# Patient Record
Sex: FEMALE | Race: WHITE | NOT HISPANIC OR LATINO | Employment: PART TIME | URBAN - METROPOLITAN AREA
[De-identification: names, ages, dates, MRNs, and addresses within clinical notes are randomized per-mention and may not be internally consistent; named-entity substitution may affect disease eponyms.]

---

## 2018-01-30 PROBLEM — I47.10 PAROXYSMAL SUPRAVENTRICULAR TACHYCARDIA: Status: ACTIVE | Noted: 2017-09-22

## 2022-06-06 DIAGNOSIS — Z30.40 ENCOUNTER FOR SURVEILLANCE OF CONTRACEPTIVES, UNSPECIFIED CONTRACEPTIVE: ICD-10-CM

## 2022-06-06 RX ORDER — NORELGESTROMIN AND ETHINYL ESTRADIOL 150; 35 UG/D; UG/D
1 PATCH TRANSDERMAL WEEKLY
Qty: 9 PATCH | Refills: 6 | Status: SHIPPED | OUTPATIENT
Start: 2022-06-06 | End: 2022-07-18 | Stop reason: SDUPTHER

## 2023-08-09 ENCOUNTER — ROUTINE PRENATAL (OUTPATIENT)
Dept: OBGYN CLINIC | Facility: CLINIC | Age: 28
End: 2023-08-09

## 2023-08-09 VITALS — BODY MASS INDEX: 26.75 KG/M2 | WEIGHT: 151 LBS | SYSTOLIC BLOOD PRESSURE: 100 MMHG | DIASTOLIC BLOOD PRESSURE: 58 MMHG

## 2023-08-09 DIAGNOSIS — Z34.02 ENCOUNTER FOR SUPERVISION OF NORMAL FIRST PREGNANCY IN SECOND TRIMESTER: Primary | ICD-10-CM

## 2023-08-09 PROCEDURE — PNV: Performed by: OBSTETRICS & GYNECOLOGY

## 2023-08-09 NOTE — PROGRESS NOTES
25 y.o. Spencer Kenny female at 20w4d (Estimated Date of Delivery: 23) for PNV. Pre- Vitals    Flowsheet Row Most Recent Value   Prenatal Assessment    Fetal Heart Rate 150   Movement Present   Prenatal Vitals    Blood Pressure 100/58   Weight - Scale 68.5 kg (151 lb)   Urine Albumin/Glucose    Dilation/Effacement/Station    Vaginal Drainage    Edema         TW.711 kg (17 lb)    Cramping/contractions: no  Bleeding: no  LOF: no  FM: yes  28 wk labs ordered: yes  TSH indicated & ordered: no  RH negative - type & screen ordered: no  Prenatal labs complete (including Heb B, HIV): yes completed 2023. Discussed constipation - reviewed fiber supplement and hydration, decreased gastric motility and use of miralax and stool softeners to help. RTO in 4 weeks.

## 2023-08-12 ENCOUNTER — LAB (OUTPATIENT)
Dept: LAB | Age: 28
End: 2023-08-12
Payer: COMMERCIAL

## 2023-08-12 DIAGNOSIS — Z34.02 ENCOUNTER FOR SUPERVISION OF NORMAL FIRST PREGNANCY IN SECOND TRIMESTER: ICD-10-CM

## 2023-08-12 LAB
ERYTHROCYTE [DISTWIDTH] IN BLOOD BY AUTOMATED COUNT: 12.8 % (ref 11.6–15.1)
GLUCOSE 1H P 50 G GLC PO SERPL-MCNC: 123 MG/DL (ref 40–134)
HCT VFR BLD AUTO: 36.9 % (ref 34.8–46.1)
HGB BLD-MCNC: 12.2 G/DL (ref 11.5–15.4)
MCH RBC QN AUTO: 31.2 PG (ref 26.8–34.3)
MCHC RBC AUTO-ENTMCNC: 33.1 G/DL (ref 31.4–37.4)
MCV RBC AUTO: 94 FL (ref 82–98)
PLATELET # BLD AUTO: 295 THOUSANDS/UL (ref 149–390)
PMV BLD AUTO: 9.7 FL (ref 8.9–12.7)
RBC # BLD AUTO: 3.91 MILLION/UL (ref 3.81–5.12)
TREPONEMA PALLIDUM IGG+IGM AB [PRESENCE] IN SERUM OR PLASMA BY IMMUNOASSAY: NORMAL
WBC # BLD AUTO: 10.91 THOUSAND/UL (ref 4.31–10.16)

## 2023-08-12 PROCEDURE — 86780 TREPONEMA PALLIDUM: CPT

## 2023-08-12 PROCEDURE — 36415 COLL VENOUS BLD VENIPUNCTURE: CPT

## 2023-08-12 PROCEDURE — 82950 GLUCOSE TEST: CPT

## 2023-08-12 PROCEDURE — 85027 COMPLETE CBC AUTOMATED: CPT

## 2023-09-05 ENCOUNTER — ROUTINE PRENATAL (OUTPATIENT)
Dept: OBGYN CLINIC | Facility: CLINIC | Age: 28
End: 2023-09-05
Payer: COMMERCIAL

## 2023-09-05 VITALS — BODY MASS INDEX: 28.2 KG/M2 | SYSTOLIC BLOOD PRESSURE: 102 MMHG | DIASTOLIC BLOOD PRESSURE: 64 MMHG | WEIGHT: 159.2 LBS

## 2023-09-05 DIAGNOSIS — Z23 NEED FOR TDAP VACCINATION: ICD-10-CM

## 2023-09-05 DIAGNOSIS — O44.43 LOW-LYING PLACENTA IN THIRD TRIMESTER: Primary | ICD-10-CM

## 2023-09-05 DIAGNOSIS — Z3A.28 28 WEEKS GESTATION OF PREGNANCY: ICD-10-CM

## 2023-09-05 PROCEDURE — 90471 IMMUNIZATION ADMIN: CPT | Performed by: PHYSICIAN ASSISTANT

## 2023-09-05 PROCEDURE — PNV: Performed by: PHYSICIAN ASSISTANT

## 2023-09-05 PROCEDURE — 90715 TDAP VACCINE 7 YRS/> IM: CPT | Performed by: PHYSICIAN ASSISTANT

## 2023-09-05 NOTE — PROGRESS NOTES
Patient is a 26 YO  female presenting to the office at 28.0 weeks for routine OB care.    BP: 102/64  TWlb  Fetal Movement: yes good movement  Feeling well today  Denies LOF, CTX, VB  Discussed third trimester teaching  Reviewed fetal kick counts, normal FM  Reviewed signs of PTL  Reviewed red folder, consents, birth plan  Delivery consent obtained   Breastfeeding: plans to  Breast Pump: ordered  TDAP: received  FLU Vaccine: recommend  COVID Vaccine: received  28 Week Labs: WNL  Low lying placenta - repeat growth US @ 32 weeks  RTO 2 weeks for routine OB F/U

## 2023-09-05 NOTE — PROGRESS NOTES
Routine prenatal 28 weeks, red folder visit. Pt c/o dizziness and nausea. Deneis vb,lof, and ctx. Tdap: Given   Red folder: Reviewed, question regarding, fetal movement/ kick counting.    Breast pump: ordered

## 2023-09-06 LAB
DME PARACHUTE DELIVERY DATE REQUESTED: NORMAL
DME PARACHUTE ITEM DESCRIPTION: NORMAL
DME PARACHUTE ORDER STATUS: NORMAL
DME PARACHUTE SUPPLIER NAME: NORMAL
DME PARACHUTE SUPPLIER PHONE: NORMAL

## 2023-09-21 ENCOUNTER — ROUTINE PRENATAL (OUTPATIENT)
Dept: OBGYN CLINIC | Facility: CLINIC | Age: 28
End: 2023-09-21

## 2023-09-21 VITALS — BODY MASS INDEX: 28.87 KG/M2 | SYSTOLIC BLOOD PRESSURE: 110 MMHG | DIASTOLIC BLOOD PRESSURE: 72 MMHG | WEIGHT: 163 LBS

## 2023-09-21 DIAGNOSIS — Z34.03 PRENATAL CARE, FIRST PREGNANCY IN THIRD TRIMESTER: Primary | ICD-10-CM

## 2023-09-21 PROCEDURE — PNV: Performed by: PHYSICIAN ASSISTANT

## 2023-09-21 NOTE — PROGRESS NOTES
Pt is here for her 30w prenatal. Pt denies any swelling, leakage,bleeding,pressure, and contractions. Pt states she has had moments where she feels herself about to faint.

## 2023-09-22 LAB
DME PARACHUTE DELIVERY DATE ACTUAL: NORMAL
DME PARACHUTE DELIVERY DATE REQUESTED: NORMAL
DME PARACHUTE ITEM DESCRIPTION: NORMAL
DME PARACHUTE ORDER STATUS: NORMAL
DME PARACHUTE SUPPLIER NAME: NORMAL
DME PARACHUTE SUPPLIER PHONE: NORMAL

## 2023-09-27 NOTE — PROGRESS NOTES
25 y.o.  female at 31w1d (Estimated Date of Delivery: 23) for PNV. Pre-Miri Vitals    Flowsheet Row Most Recent Value   Prenatal Assessment    Fetal Heart Rate 149   Fundal Height (cm) 30 cm   Movement Present   Prenatal Vitals    Blood Pressure 110/72   Weight - Scale 73.9 kg (163 lb)   Urine Albumin/Glucose    Dilation/Effacement/Station    Vaginal Drainage    Edema         TW.2 kg (29 lb)    Leakage of fluid: no  Vaginal bleeding: no  Contractions/Cramping: no  Fetal movement: yes  Overall feeling well  Pt c/o moments where she feels like she will pass out  BP normal to low  encouraged pt to eat, small, frequents snacks/meals and hydrate more  BW all normal  Ice pack on neck helps  Blood type A pos  Recd tdap and breast pump    RTO in 2 weeks.

## 2023-10-04 ENCOUNTER — TELEPHONE (OUTPATIENT)
Facility: HOSPITAL | Age: 28
End: 2023-10-04

## 2023-10-04 NOTE — TELEPHONE ENCOUNTER
Left voicemail informing the patient of a possible scan/send for her upcoming appointment on Monday. Requested she give our office a call back at [phone] with any questions or if she would need to reschedule.

## 2023-10-09 ENCOUNTER — ROUTINE PRENATAL (OUTPATIENT)
Dept: OBGYN CLINIC | Facility: CLINIC | Age: 28
End: 2023-10-09

## 2023-10-09 ENCOUNTER — ROUTINE PRENATAL (OUTPATIENT)
Facility: HOSPITAL | Age: 28
End: 2023-10-09
Payer: COMMERCIAL

## 2023-10-09 VITALS — BODY MASS INDEX: 29.58 KG/M2 | WEIGHT: 167 LBS | DIASTOLIC BLOOD PRESSURE: 72 MMHG | SYSTOLIC BLOOD PRESSURE: 110 MMHG

## 2023-10-09 VITALS
WEIGHT: 166.4 LBS | DIASTOLIC BLOOD PRESSURE: 70 MMHG | HEART RATE: 88 BPM | HEIGHT: 63 IN | SYSTOLIC BLOOD PRESSURE: 110 MMHG | BODY MASS INDEX: 29.48 KG/M2

## 2023-10-09 DIAGNOSIS — Z3A.32 32 WEEKS GESTATION OF PREGNANCY: ICD-10-CM

## 2023-10-09 DIAGNOSIS — Z03.72 SUSPECTED PROBLEM WITH PLACENTA NOT FOUND: Primary | ICD-10-CM

## 2023-10-09 DIAGNOSIS — Z34.03 ENCOUNTER FOR SUPERVISION OF NORMAL FIRST PREGNANCY IN THIRD TRIMESTER: Primary | ICD-10-CM

## 2023-10-09 PROBLEM — O44.43 LOW-LYING PLACENTA IN THIRD TRIMESTER: Status: RESOLVED | Noted: 2023-09-05 | Resolved: 2023-10-09

## 2023-10-09 PROCEDURE — PNV: Performed by: OBSTETRICS & GYNECOLOGY

## 2023-10-09 PROCEDURE — 76816 OB US FOLLOW-UP PER FETUS: CPT | Performed by: OBSTETRICS & GYNECOLOGY

## 2023-10-09 NOTE — PROGRESS NOTES
Ultrasound Probe Disinfection    A transvaginal ultrasound was performed. Prior to use, disinfection was performed with High Level Disinfection Process ([employer]). Probe serial number A2: [id number] was used.       Christine Williamson  10/09/23  10:12 AM

## 2023-10-09 NOTE — PROGRESS NOTES
25 [facility] (Estimated Date of Delivery: 23) for PNV. Pre- Vitals      Flowsheet Row Most Recent Value   Prenatal Assessment    Fetal Heart Rate 140   Fundal Height (cm) 33 cm   Movement Present   Prenatal Vitals    Blood Pressure 110/72   Weight - Scale 75.8 kg (167 lb)   Urine Albumin/Glucose    Dilation/Effacement/Station    Vaginal Drainage    Edema           TWG: 15 kg (33 lb)    Leakage of fluid: no  Vaginal bleeding: no  Contractions/Cramping: no  Fetal movement: yes  Perineal massage recommendations reviewed with patient. Additional instructions provided to patient in AVS.   RTO in 2 weeks.

## 2023-10-09 NOTE — PROGRESS NOTES
Sharon Thayer is here today at 32w5d for an ultrasound for fetal growth, placental location and to complete the missed anatomy of the ductal arch, hands, lower legs, profile, nose lips, palate and short axis of the great vessels. She had normal 28-week labs. Problem list:  1. Low-lying placenta  2. Maternal asthma    Ultrasound findings: The ultrasound today shows normal interval fetal growth and fluid. Prior missed anatomy was reviewed and appears normal except the fetal hands and the fetal chin are still limited secondary to fetal position. The placenta is no longer low-lying. Fetal hands were present on her 20 week US but not viewed well enough to count fingers. The 3D views of this fetus at 25 weeks and today are not suggestive of a problem with the fetal profile. Pregnancy ultrasound has limitations and is unable to detect all forms of fetal congenital abnormalities. The inaccuracy in the EFW can be off by 1 lb either way in the third trimester. Follow up recommended:   1. No further follow US is recommended.      Jose Cordero MD

## 2023-10-09 NOTE — PATIENT INSTRUCTIONS
Perineal/Vaginal Massage    Your perineum includes the area at the back of your vagina and vulva and goes to your anus and rectum, and includes the back portion of the birth canal. It is the tissues of your perineum that create a strong pelvic floor, and allow you to walk upright and prevent you from urinating every time you cough. Needless to say, it is important that these tissues are intact and strong, but they also need to be flexible enough to stretch during childbirth to allow the baby to move through the birth canal.    Perineal massage during the last several weeks of pregnancy can relax and stretch the tissues of your perineum. This gentle massage keeps the perineal tissues flexible and supple and prepares them to relax and expand naturally during delivery. What can I do now to decrease my chances of tearing during delivery? Massaging around the vaginal opening by you (or your partner), either before birth or during the 2nd stage of labor (massage done by your physician), can reduce the likelihood of perineal tearing during childbirth. Likewise, the use of warm packs held on the perineum during the pushing stage of labor can reduce the severity of your tear. This will happen during the pushing stage of labor. At home, you could also help reduce the chances of injury that may occur during the birth of your child through perineal massage. When should I do this? Starting around or shortly after 34 weeks of pregnancy, your or your partner should provide 5 to 10 minutes of vaginal massage 1 to 4 times per week. What do I use?   Types of lubricant to try:  natural oils, like organic sunflower, grapeseed, coconut, almond, or olive  personal lubricants, like K-Y Jelly, are also a good choice because they’re water soluble  your body’s own vaginal lubricant, if this makes you more comfortable  Whatever you choose, stay away from using synthetic oils or lubricants, like baby oil, mineral oil, or petroleum jelly. How? 1. Find a comfortable position that allows you to reach your perineum, either by reaching your hands in front of you or behind you. For example, a) Sitting propped up in bed with your knees bent, b) squatting against a wall for support with or without the aid of a stack of books or stool, and c) raising one leg such as in the shower or on the toilet. Feel free to use di?erent positions on di?erent days or even change positions during your massage if you become uncomfortable or tired. 2. Use either almond, coconut or olive oil and water mixture on 1 or 2 fingers or thumbs, depending on comfort. 3. Perineal massage can be done with either or both thumbs, your index or middle ?ngers, or two ?ngers on each hand. 4. Insert your thumbs or ?ngers about an inch inside the vagina (up to the first knuckle or just past that), resting your palms against your inside leg. 5. Apply sweeping downward inside word pressure from 3 to 9 o'clock for 5 to 10 minutes. Repeat 1 to 4 times a week. 6. The goal is to stretch and massage the back portion of the birth canal, down towards the anus and then apart side to side, using more and more pressure over time. Press gently down with your thumbs or ?ngers toward the anus, and then pull them apart from each other and out to the sides. Several times during your massage, hold this stretched position and consciously relax your muscles in this region. Keep massaging down and out to stretch and relax these tissues. Pressure should not be painful, but during the first couple of weeks, it is normal to feel a slight burning or stretching sensation.

## 2023-10-09 NOTE — PROGRESS NOTES
Pt is here for her 32w prenatal. Pt denies any swelling,leakage,bleeding,pressure, and contractions. Pt doing well no concerns.  Urine dip is neg/neg

## 2023-10-09 NOTE — LETTER
October 9, 2023     Miriam Haynes PA-C  [address]  [address]     Patient: Keyanna Peguero   YOB: 1995   Date of Visit: 10/9/2023       Dear Dr. Eloisa Zimmer: Thank you for referring Keyanna Peguero to me for evaluation. Below are my notes for this consultation. If you have questions, please do not hesitate to call me. I look forward to following your patient along with you. Sincerely,        Ramirez Murray MD        CC: No Recipients    Ramirez Murray MD  10/9/2023  5:54 PM  Sign when Signing Visit  Keyanna Peguero is here today at 32w5d for an ultrasound for fetal growth, placental location and to complete the missed anatomy of the ductal arch, hands, lower legs, profile, nose lips, palate and short axis of the great vessels. She had normal 28-week labs. Problem list:  1. Low-lying placenta  2. Maternal asthma    Ultrasound findings: The ultrasound today shows normal interval fetal growth and fluid. Prior missed anatomy was reviewed and appears normal except the fetal hands and the fetal chin are still limited secondary to fetal position. The placenta is no longer low-lying. Fetal hands were present on her 20 week US but not viewed well enough to count fingers. The 3D views of this fetus at 25 weeks and today are not suggestive of a problem with the fetal profile. Pregnancy ultrasound has limitations and is unable to detect all forms of fetal congenital abnormalities. The inaccuracy in the EFW can be off by 1 lb either way in the third trimester. Follow up recommended:   1. No further follow US is recommended.      Ramirez Murray MD

## 2023-10-13 ENCOUNTER — TELEPHONE (OUTPATIENT)
Dept: PERINATAL CARE | Facility: OTHER | Age: 28
End: 2023-10-13

## 2023-10-13 NOTE — TELEPHONE ENCOUNTER
Phone call to patient M to view Dr. Joe Chacon message, that patient no longer needs follow up as per Dr. Arlene Ferguson message "Congratulations on your placenta moving away from your cervix. At this time I do not recommend any further ultrasounds for the baby's growth or anatomy."   Advised patient to read IT MOVES IT message and feel free to call with any questions or concerns.

## 2023-10-15 PROBLEM — Z34.03 ENCOUNTER FOR SUPERVISION OF NORMAL FIRST PREGNANCY IN THIRD TRIMESTER: Status: ACTIVE | Noted: 2023-07-12

## 2023-10-24 ENCOUNTER — ROUTINE PRENATAL (OUTPATIENT)
Dept: OBGYN CLINIC | Facility: CLINIC | Age: 28
End: 2023-10-24
Payer: COMMERCIAL

## 2023-10-24 VITALS — WEIGHT: 168 LBS | BODY MASS INDEX: 29.76 KG/M2 | DIASTOLIC BLOOD PRESSURE: 72 MMHG | SYSTOLIC BLOOD PRESSURE: 116 MMHG

## 2023-10-24 DIAGNOSIS — Z3A.35 35 WEEKS GESTATION OF PREGNANCY: ICD-10-CM

## 2023-10-24 DIAGNOSIS — Z23 NEED FOR INFLUENZA VACCINATION: ICD-10-CM

## 2023-10-24 DIAGNOSIS — Z34.03 ENCOUNTER FOR SUPERVISION OF NORMAL FIRST PREGNANCY IN THIRD TRIMESTER: Primary | ICD-10-CM

## 2023-10-24 PROCEDURE — 90686 IIV4 VACC NO PRSV 0.5 ML IM: CPT | Performed by: OBSTETRICS & GYNECOLOGY

## 2023-10-24 PROCEDURE — PNV: Performed by: OBSTETRICS & GYNECOLOGY

## 2023-10-24 PROCEDURE — 90471 IMMUNIZATION ADMIN: CPT | Performed by: OBSTETRICS & GYNECOLOGY

## 2023-10-24 NOTE — PROGRESS NOTES
25 y.o.   female at 28 wga for PNV. BP : 116/72.  TW  Feeling tired, occ dizzines  Placenta - no longer low lying  Flu vaccne today  Reviewed PTL precautions and FKCs  F/u 1 week

## 2023-10-24 NOTE — PROGRESS NOTES
Pt is here for her 35w prenatal. Pt denies any leakage,bleeding,pressure, and contractions. Pt has swelling in right foot. Flu shot due.

## 2023-11-06 ENCOUNTER — ROUTINE PRENATAL (OUTPATIENT)
Dept: OBGYN CLINIC | Facility: CLINIC | Age: 28
End: 2023-11-06

## 2023-11-06 VITALS — WEIGHT: 170 LBS | BODY MASS INDEX: 30.11 KG/M2 | SYSTOLIC BLOOD PRESSURE: 110 MMHG | DIASTOLIC BLOOD PRESSURE: 70 MMHG

## 2023-11-06 DIAGNOSIS — Z3A.36 36 WEEKS GESTATION OF PREGNANCY: ICD-10-CM

## 2023-11-06 DIAGNOSIS — Z34.03 ENCOUNTER FOR SUPERVISION OF NORMAL FIRST PREGNANCY IN THIRD TRIMESTER: Primary | ICD-10-CM

## 2023-11-06 PROCEDURE — 87491 CHLMYD TRACH DNA AMP PROBE: CPT | Performed by: PHYSICIAN ASSISTANT

## 2023-11-06 PROCEDURE — 87591 N.GONORRHOEAE DNA AMP PROB: CPT | Performed by: PHYSICIAN ASSISTANT

## 2023-11-06 PROCEDURE — PNV: Performed by: PHYSICIAN ASSISTANT

## 2023-11-06 PROCEDURE — 87150 DNA/RNA AMPLIFIED PROBE: CPT | Performed by: PHYSICIAN ASSISTANT

## 2023-11-06 NOTE — PROGRESS NOTES
Patient is a 24 YO  female presenting to the office at 36.6 weeks for routine OB care.    BP: 110/70  TWlb  Fetal Movement: yes good movement  LOF: no  VB: no  CTX: no  SVE FT/T/H  Considering 39 week IOL  Reviewed precautions  Call for concerns  RTO 1 weeks

## 2023-11-06 NOTE — PROGRESS NOTES
Routine prenatal 36 weeks, Gbs and gc due today. Pt c/o numbness in the right hand. Denies vb,lof, and ctx.

## 2023-11-07 LAB
C TRACH DNA SPEC QL NAA+PROBE: NEGATIVE
N GONORRHOEA DNA SPEC QL NAA+PROBE: NEGATIVE

## 2023-11-08 LAB — GP B STREP DNA SPEC QL NAA+PROBE: NEGATIVE

## 2023-11-15 ENCOUNTER — ROUTINE PRENATAL (OUTPATIENT)
Dept: OBGYN CLINIC | Facility: CLINIC | Age: 28
End: 2023-11-15

## 2023-11-15 VITALS — SYSTOLIC BLOOD PRESSURE: 112 MMHG | WEIGHT: 172 LBS | DIASTOLIC BLOOD PRESSURE: 78 MMHG | BODY MASS INDEX: 30.47 KG/M2

## 2023-11-15 DIAGNOSIS — Z34.03 ENCOUNTER FOR SUPERVISION OF NORMAL FIRST PREGNANCY IN THIRD TRIMESTER: Primary | ICD-10-CM

## 2023-11-15 PROCEDURE — PNV: Performed by: OBSTETRICS & GYNECOLOGY

## 2023-11-15 NOTE — PROGRESS NOTES
25 y.o. Carmelo Dill female at 38w1d (Estimated Date of Delivery: 23) for PNV. Pre-Miri Vitals      Flowsheet Row Most Recent Value   Prenatal Assessment    Fetal Heart Rate 125   Movement Present   Prenatal Vitals    Blood Pressure 112/78   Weight - Scale 78 kg (172 lb)   Urine Albumin/Glucose    Dilation/Effacement/Station    Cervical Dilation 1   Cervical Effacement 50   Fetal Station -2   Vaginal Drainage    Edema           TW.2 kg (38 lb)    Leakage of fluid: no  Vaginal bleeding: no  Contractions/Cramping: no  Fetal movement: yes  SVE 1/50/-2, soft, posterior. Discussed delivery plan. Patient is amenable to IOL around MARCIO, would prefer to wait for IOL until after Thanksgiving for IOL. Looking at week of her MARCIO, starting 23. Will plan to schedule ahead of time. Discussed with patient anticipating cervical ripening needed. Labor precautions reviewed. GBS negative. RTO in 1 weeks.

## 2023-11-15 NOTE — PROGRESS NOTES
Routine prenatal 38 weeks, cervical check. Pt is well, would like to discuss labor and delivery. Denies vb, lof, and ctx.

## 2023-11-24 ENCOUNTER — ROUTINE PRENATAL (OUTPATIENT)
Dept: OBGYN CLINIC | Facility: CLINIC | Age: 28
End: 2023-11-24

## 2023-11-24 VITALS — WEIGHT: 172.4 LBS | SYSTOLIC BLOOD PRESSURE: 114 MMHG | BODY MASS INDEX: 30.54 KG/M2 | DIASTOLIC BLOOD PRESSURE: 72 MMHG

## 2023-11-24 DIAGNOSIS — Z3A.39 39 WEEKS GESTATION OF PREGNANCY: Primary | ICD-10-CM

## 2023-11-24 DIAGNOSIS — Z34.03 ENCOUNTER FOR SUPERVISION OF NORMAL FIRST PREGNANCY IN THIRD TRIMESTER: ICD-10-CM

## 2023-11-24 PROCEDURE — PNV: Performed by: OBSTETRICS & GYNECOLOGY

## 2023-11-24 NOTE — PATIENT INSTRUCTIONS
Am I in labour? As you enter the final stages of your pregnancy, your body will give signs that labour is approaching. The following information should help you to understand these signs and make it easier for you to determine whether you are in labour. Some of the signs and symptoms of going into labour may include:    period-like cramps  backache  diarrhoea  mucous discharge or ‘show’  gush or trickle of water as the membranes break  contractions  Engagement  As you move closer to delivery, your baby’s head may drop and become engaged in your pelvis in preparation for labour. If you are expecting your first baby, you may notice pressure in your groin and on your bladder beginning up to four weeks before the birth. You may also notice that you can breathe a little easier and have a little more appetite as your baby drops, and is not pushed up against your diaphragm and stomach quite so much. This is sometimes known as “lightening”, as women generally feel lighter. Show    During your pregnancy a mucous plug fills your cervix. Towards the end of pregnancy, the cervix becomes softer and this mucous plug may become loosened and start to come away. The process of discharging this mucous is called a ‘show’ and might often contain streaks of blood or may also be brownish in colour. This is different from any flow of fresh blood which you would report immediately to your doctor or the hospital. The show may continue over a period of hours or even days. It is one of the signs that your body is preparing for birth. Labour may begin in the next few days, hours or weeks following a show. There is no need to phone the hospital if you have had a show. Water breaking (rupture of membranes)    This may occur at any time prior to the start of labour, or at any time during labour. The break may be low, near the opening of the uterus, and will produce a gush of amniotic fluid.  If this occurs, place a sanitary pad on and note the colour of the fluid. Ring the hospital and tell the midwife that this has occurred. You will usually be asked to come in to the hospital.    Another type of amniotic fluid leak may occur higher up in the amniotic sack, or top of the uterus. This will be less obvious to you and you may only notice a trickle of fluid. Since many women have a heavy vaginal discharge or leak a small amount of urine towards the end of their pregnancy and it is often difficult to tell the difference between urine and amniotic fluid - urine is often yellow; where amniotic fluid is usually clear, or has a pink tinge, and has a "sweet" odour. If you are unsure, ring the hospital.    If the colour of the fluid is green or brownish, it indicates that your baby has passed a bowel motion (meconium) inside the uterus. It is very common to have meconium-stained amniotic fluid in a pregnancy over 41 weeks, but this may also be a sign that your baby is distressed. You will need to ring the hospital immediately and then come into the hospital.    Contractions    Hardik Goetz contractions  [place] Goetz contractions are sometimes mistaken for labour. These “practice” contractions usually start jail through the pregnancy and continue right through to the end. These contractions are often irregular and can be uncomfortable and tight. [place] Goetz contractions usually increase in regularity and strength towards the end of your pregnancy, preparing your uterus for the birth. Sometimes it is difficult to distinguish between these [facility] contractions and labour contractions. Below are the common differences between the two. Labour contractions  True labour contractions usually increase in strength and duration. In order to time your contractions, time the interval between the start of one contraction to the start of the next. Early labour contractions are often likened to period cramps with or without backache.     [facility] contractions    Labour contractions    usually irregular and short    become regular with time    do not get closer together    get closer together with time    do not get stronger     become stronger    walking does not make them stronger  walking can make them stronger    lying down may make them go away   lying down does not make them go away    uncomfortable and tight--not painful   Painful - can't walk, talk or breathe through them        How does labour start? Labour can start in different ways. You may be start experiencing some period like pains or contractions. You might notice that these tightenings/contractions start to get stronger, closer and last longer than before. Or you might start with some back ache or a stomach upset that gets stronger and develops into regular contractions. In approximately 10-15% of women, labour will start when your membranes rupture. Contractions usually follow. Should I call my doctor? You should call your doctor at [phone] when:    - your rose break  - you have bright blood loss  - your contractions are regular and five minutes apart  - if you have decreased fetal movement  Induction of Labor is defined as “having labor started (induced) before the labor begins on its  own. “Indicated Induction” - If the labor is started due to a medical condition you or the baby have, it is an indicated induction. This may be done if the continuing of the pregnancy is of more risk than the delivering of the baby. “Elective Induction” - If the labor is started for convenience, it is an elective induction. When can you have an elective induction? Your healthcare provider must be sure of your due date. Your are at least 39 weeks pregnant. When your cervix (the opening to the birth canal) shows signs of being ready for labor (your healthcare provider will discuss this with you).   Your healthcare provider must confirm you have not had a previous caesarean section or major surgery on your uterus. Risks of elective induction:  Baby's lungs may not be mature and baby may need to go to the  Intensive Care Unit. Breastfeeding may be delayed. Your baby may have trouble maintaining his/her body temperature. You may have a longer labor. You have an increased risk of having a caesarean section. How is labor induced? There are several methods to induce labor. Your doctor will chose the method that is best for you:  Stripping of membranes: The doctor examines your cervix and uses the tip of a finger to separate the amniotic membrane from the wall of your uterus.  this membrane causes your body to release hormones call prostaglandins. Prostaglandins help the cervix become soft and thin so it is prepared for labor. Breaking your water: This can help to bring to labor. The doctor uses a plastic hook to make a small opening in the bag of water. Cervical ripening agents: These are synthetic agents similar to what your body makes to ripen your cervix or start your labor. Prostaglandins which are used to thin and soften the cervix to prepare it for labor include: How is labor induced… cont'd…  Cervidil - this is a vaginal insert which must be kept in the vagina for up to 12 hours. Cytotec - small tablets placed in the vagina near the cervix. Prostaglandin gel - this is a gel which is applied with a syringe and small catheter to the area around the cervix. The hormone Oxytocin (Pitocin) - this medication is given through an IV and is regulated with the assistance of an IV pump. The medication is started in a small dose and increased slowly with until labor is progressing. All of the above require close monitoring of both you and your baby. You will have a fetal monitor for your baby,  and your vital signs will be taken often. Fetal monitoring intervals as well as your vital signs will be based on the method and the doctor orders.   Not all inductions result in labor, and if after hours or days of trying, it is possible a woman may end up having a  section. Medically Indicated Inductions:  Medically indicated inductions may be done because: Your water broke but labor has not started. You are at least one week past your due date. You have an infection in your uterus. You have a pregnancy complicated by risk factors. Examples include:  Gestational diabetes. High blood pressure. There is a problem with your placenta or with the growth of your baby. Not reassuring fetal heart rate pattern. Decrease in the fluid around your baby. Augmentation of Labor:  If your labor is not progressing (cervical changes dilating and thinning) at a normal rate, your doctor may decide to augment (help a long) your labor by breaking your water or using the IV medication Oxytocin (Pitocin). Risks of Labor Inductions:  Stripping of membranes - infection, bleeding, accidental rupture of membranes. Amniotomy  (rupture of membranes by the doctor) infection, prolapse of the umbilical cord, changes in the baby's position. Cervical ripening agent - uterine contractions that are too strong or too long and may cause the baby to be stressed and may result in a caesarean section. Pitocin IV Medication - contractions that are too strong, too frequent or too long resulting in the baby being stressed and may result in a caesarean section, uterine rupture, or water intoxication (a disturbance in your electrolytes).

## 2023-11-24 NOTE — PROGRESS NOTES
25 y.o.  female at 39w3d (Estimated Date of Delivery: 23) for PNV. Pre-Miri Vitals      Flowsheet Row Most Recent Value   Prenatal Assessment    Movement Present   Prenatal Vitals    Blood Pressure 114/72   Weight - Scale 78.2 kg (172 lb 6.4 oz)   Urine Albumin/Glucose    Dilation/Effacement/Station    Vaginal Drainage    Edema           TW.4 kg (38 lb 6.4 oz)    Leakage of fluid: no  Vaginal bleeding: no  Contractions/Cramping: yes  Fetal movement: yes    Membranes swept, scheduled for IOL Tuesday 3pm if not delivered, consent signed. RTO in 1 weeks.

## 2023-11-28 ENCOUNTER — ANESTHESIA (INPATIENT)
Dept: LABOR AND DELIVERY | Facility: HOSPITAL | Age: 28
End: 2023-11-28
Payer: COMMERCIAL

## 2023-11-28 ENCOUNTER — HOSPITAL ENCOUNTER (INPATIENT)
Facility: HOSPITAL | Age: 28
LOS: 3 days | Discharge: HOME | End: 2023-12-01
Attending: OBSTETRICS & GYNECOLOGY | Admitting: OBSTETRICS & GYNECOLOGY
Payer: COMMERCIAL

## 2023-11-28 ENCOUNTER — ANESTHESIA EVENT (INPATIENT)
Dept: LABOR AND DELIVERY | Facility: HOSPITAL | Age: 28
End: 2023-11-28
Payer: COMMERCIAL

## 2023-11-28 DIAGNOSIS — O36.8390 NON-REASSURING ELECTRONIC FETAL MONITORING TRACING: ICD-10-CM

## 2023-11-28 DIAGNOSIS — Z98.891 STATUS POST PRIMARY LOW TRANSVERSE CESAREAN SECTION: ICD-10-CM

## 2023-11-28 DIAGNOSIS — Z3A.39 39 WEEKS GESTATION OF PREGNANCY: Primary | ICD-10-CM

## 2023-11-28 DIAGNOSIS — Z3A.40 40 WEEKS GESTATION OF PREGNANCY: ICD-10-CM

## 2023-11-28 PROBLEM — J45.909 MILD ASTHMA: Status: ACTIVE | Noted: 2023-11-28

## 2023-11-28 LAB
ABO GROUP BLD: NORMAL
BLD GP AB SCN SERPL QL: NEGATIVE
ERYTHROCYTE [DISTWIDTH] IN BLOOD BY AUTOMATED COUNT: 13.3 % (ref 11.6–15.1)
HCT VFR BLD AUTO: 33.9 % (ref 34.8–46.1)
HGB BLD-MCNC: 10.8 G/DL (ref 11.5–15.4)
HOLD SPECIMEN: NORMAL
MCH RBC QN AUTO: 27.7 PG (ref 26.8–34.3)
MCHC RBC AUTO-ENTMCNC: 31.9 G/DL (ref 31.4–37.4)
MCV RBC AUTO: 87 FL (ref 82–98)
PLATELET # BLD AUTO: 226 THOUSANDS/UL (ref 149–390)
PMV BLD AUTO: 10.1 FL (ref 8.9–12.7)
RBC # BLD AUTO: 3.9 MILLION/UL (ref 3.81–5.12)
RH BLD: POSITIVE
SPECIMEN EXPIRATION DATE: NORMAL
TREPONEMA PALLIDUM IGG+IGM AB [PRESENCE] IN SERUM OR PLASMA BY IMMUNOASSAY: NORMAL
WBC # BLD AUTO: 9.84 THOUSAND/UL (ref 4.31–10.16)

## 2023-11-28 PROCEDURE — 85027 COMPLETE CBC AUTOMATED: CPT

## 2023-11-28 PROCEDURE — 86900 BLOOD TYPING SEROLOGIC ABO: CPT

## 2023-11-28 PROCEDURE — 86780 TREPONEMA PALLIDUM: CPT

## 2023-11-28 PROCEDURE — NC001 PR NO CHARGE: Performed by: OBSTETRICS & GYNECOLOGY

## 2023-11-28 PROCEDURE — 3E0P7VZ INTRODUCTION OF HORMONE INTO FEMALE REPRODUCTIVE, VIA NATURAL OR ARTIFICIAL OPENING: ICD-10-PCS | Performed by: OBSTETRICS & GYNECOLOGY

## 2023-11-28 PROCEDURE — 86901 BLOOD TYPING SEROLOGIC RH(D): CPT

## 2023-11-28 PROCEDURE — 86850 RBC ANTIBODY SCREEN: CPT

## 2023-11-28 PROCEDURE — 4A1HXCZ MONITORING OF PRODUCTS OF CONCEPTION, CARDIAC RATE, EXTERNAL APPROACH: ICD-10-PCS | Performed by: OBSTETRICS & GYNECOLOGY

## 2023-11-28 RX ORDER — ONDANSETRON 2 MG/ML
4 INJECTION INTRAMUSCULAR; INTRAVENOUS EVERY 6 HOURS PRN
Status: DISCONTINUED | OUTPATIENT
Start: 2023-11-28 | End: 2023-11-29

## 2023-11-28 RX ORDER — SODIUM CHLORIDE, SODIUM LACTATE, POTASSIUM CHLORIDE, CALCIUM CHLORIDE 600; 310; 30; 20 MG/100ML; MG/100ML; MG/100ML; MG/100ML
125 INJECTION, SOLUTION INTRAVENOUS CONTINUOUS
Status: DISCONTINUED | OUTPATIENT
Start: 2023-11-28 | End: 2023-12-01 | Stop reason: HOSPADM

## 2023-11-28 RX ORDER — CALCIUM CARBONATE 500 MG/1
1000 TABLET, CHEWABLE ORAL 3 TIMES DAILY PRN
Status: DISCONTINUED | OUTPATIENT
Start: 2023-11-28 | End: 2023-12-01 | Stop reason: HOSPADM

## 2023-11-28 RX ORDER — METOCLOPRAMIDE HYDROCHLORIDE 5 MG/ML
10 INJECTION INTRAMUSCULAR; INTRAVENOUS EVERY 6 HOURS PRN
Status: DISCONTINUED | OUTPATIENT
Start: 2023-11-28 | End: 2023-12-01 | Stop reason: HOSPADM

## 2023-11-28 RX ORDER — ALBUTEROL SULFATE 90 UG/1
2 AEROSOL, METERED RESPIRATORY (INHALATION) EVERY 6 HOURS PRN
Status: DISCONTINUED | OUTPATIENT
Start: 2023-11-28 | End: 2023-12-01 | Stop reason: HOSPADM

## 2023-11-28 RX ORDER — LIDOCAINE HYDROCHLORIDE AND EPINEPHRINE 15; 5 MG/ML; UG/ML
INJECTION, SOLUTION EPIDURAL
Status: COMPLETED | OUTPATIENT
Start: 2023-11-28 | End: 2023-11-28

## 2023-11-28 RX ORDER — BUPIVACAINE HYDROCHLORIDE 2.5 MG/ML
30 INJECTION, SOLUTION EPIDURAL; INFILTRATION; INTRACAUDAL ONCE AS NEEDED
Status: DISCONTINUED | OUTPATIENT
Start: 2023-11-28 | End: 2023-11-29

## 2023-11-28 RX ORDER — OXYTOCIN/RINGER'S LACTATE 30/500 ML
1-30 PLASTIC BAG, INJECTION (ML) INTRAVENOUS
Status: DISCONTINUED | OUTPATIENT
Start: 2023-11-28 | End: 2023-11-29

## 2023-11-28 RX ADMIN — MORPHINE SULFATE 2 MG: 2 INJECTION, SOLUTION INTRAMUSCULAR; INTRAVENOUS at 22:13

## 2023-11-28 RX ADMIN — Medication 2 MILLI-UNITS/MIN: at 20:11

## 2023-11-28 RX ADMIN — LIDOCAINE HYDROCHLORIDE AND EPINEPHRINE 3 ML: 15; 5 INJECTION, SOLUTION EPIDURAL at 23:05

## 2023-11-28 RX ADMIN — SODIUM CHLORIDE, SODIUM LACTATE, POTASSIUM CHLORIDE, AND CALCIUM CHLORIDE 125 ML/HR: .6; .31; .03; .02 INJECTION, SOLUTION INTRAVENOUS at 15:31

## 2023-11-28 RX ADMIN — SODIUM CHLORIDE, SODIUM LACTATE, POTASSIUM CHLORIDE, AND CALCIUM CHLORIDE 125 ML/HR: .6; .31; .03; .02 INJECTION, SOLUTION INTRAVENOUS at 21:45

## 2023-11-28 RX ADMIN — CALCIUM CARBONATE 1000 MG: 500 TABLET, CHEWABLE ORAL at 17:52

## 2023-11-28 RX ADMIN — ONDANSETRON 4 MG: 2 INJECTION INTRAMUSCULAR; INTRAVENOUS at 21:10

## 2023-11-28 RX ADMIN — ROPIVACAINE HYDROCHLORIDE: 2 INJECTION, SOLUTION EPIDURAL; INFILTRATION at 23:20

## 2023-11-28 RX ADMIN — ONDANSETRON 4 MG: 2 INJECTION INTRAMUSCULAR; INTRAVENOUS at 15:23

## 2023-11-28 RX ADMIN — Medication 25 MCG: at 15:52

## 2023-11-28 RX ADMIN — METOCLOPRAMIDE 10 MG: 5 INJECTION, SOLUTION INTRAMUSCULAR; INTRAVENOUS at 22:13

## 2023-11-28 RX ADMIN — LIDOCAINE HYDROCHLORIDE AND EPINEPHRINE 2 ML: 15; 5 INJECTION, SOLUTION EPIDURAL at 23:08

## 2023-11-28 NOTE — H&P
[facility]   Rosalva Hamilton 25 y.o. female MRN: [id number]  Unit/Bed#: LD  Encounter: [id number]    Assessment: 25 y.o. Lashaun Pin at 40w0d admitted for elective induction of labor. SVE: /-2  FHT: 140bpm  Clinical EFW: 34% ; Cephalic confirmed by TAUS  GBS status: neg     Plan:   Mild asthma  Assessment & Plan  Exercise induced  Albuterol PRN    RBBB  Assessment & Plan  Remote history  Normal echo     * 40 weeks gestation of pregnancy  Assessment & Plan  Admit to OBN   Clear liquid diet   F/u T&S, CBC, RPR   IVF LR 125cc/hr   Continuous fetal monitoring and tocometry   Analgesia at maternal request   Induction plan FB, cyto, pitocin         Discussed case and plan w/ Dr. Mai Kraft      Chief Complaint: none    HPI: Rosalva Hamilton is a 25 y.o. Shirinn Pin with an MARCIO of 2023, by Last Menstrual Period at 40w0d who is being admitted for induction of labor. She denies having uterine contractions, has no LOF, and reports no VB. She states she has felt good FM. Patient Active Problem List   Diagnosis    ADHD (attention deficit hyperactivity disorder), combined type    Anxiety disorder    Eczema    Migraines    RBBB    Encounter for supervision of normal first pregnancy in third trimester    40 weeks gestation of pregnancy    Mild asthma       Baby complications/comments: none    Review of Systems   Constitutional:  Negative for chills and fever. Eyes:  Negative for visual disturbance. Respiratory:  Negative for shortness of breath. Cardiovascular:  Negative for chest pain. Gastrointestinal:  Negative for diarrhea, nausea and vomiting. Genitourinary:  Negative for dysuria, flank pain, hematuria, vaginal bleeding and vaginal discharge. Skin:  Negative for rash. Neurological:  Negative for dizziness, numbness and headaches. All other systems reviewed and are negative.       OB Hx:  OB History    Para Term  AB Living   1 0 0 0 0 0   SAB IAB Ectopic Multiple Live Births 0 0 0 0 0      # Outcome Date GA Lbr Vik/2nd Weight Sex Delivery Anes PTL Lv   1 Current               Obstetric Comments   Menarche 6       Past Medical Hx:  Past Medical History:   Diagnosis Date    Abnormal Pap smear of cervix     Anxiety     Asthma     Eczema     Kidney stone     Migraine     Palpitations 2015    Tachycardia     Varicella        Past Surgical hx:  Past Surgical History:   Procedure Laterality Date    WISDOM TOOTH EXTRACTION         Social Hx:  Alcohol use: denies  Tobacco use: denies  Other substance use: denies    Allergies   Allergen Reactions    Grapefruit Extract - Food Allergy Cough     Throat turns red     Latex Rash and Cough    Ceclor [Cefaclor] Hives     Other reaction(s): hives    Vinyl Ether Swelling and Cough       Medications Prior to Admission   Medication    albuterol (ProAir HFA) 90 mcg/act inhaler    MAGNESIUM PO    Prenat-Fe Carbonyl-FA-Omega 3 (ONE-A-DAY WOMENS PRENATAL 1 PO)       Objective:  Temp:  [97.9 °F (36.6 °C)] 97.9 °F (36.6 °C)  HR:  [104] 104  Resp:  [18] 18  BP: (130)/(81) 130/81  There is no height or weight on file to calculate BMI. Physical Exam:  Physical Exam  Constitutional:       General: She is not in acute distress. Appearance: Normal appearance. HENT:      Head: Normocephalic and atraumatic. Eyes:      Extraocular Movements: Extraocular movements intact. Cardiovascular:      Rate and Rhythm: Normal rate. Pulses: Normal pulses. Pulmonary:      Effort: Pulmonary effort is normal. No respiratory distress. Abdominal:      Tenderness: There is no abdominal tenderness. There is no guarding. Comments: gravid   Musculoskeletal:         General: Normal range of motion. Cervical back: Normal range of motion. Neurological:      General: No focal deficit present. Mental Status: She is alert. Mental status is at baseline. Skin:     General: Skin is warm and dry.    Psychiatric:         Mood and Affect: Mood normal. Behavior: Behavior normal.   Vitals reviewed.           Lab Results   Component Value Date    WBC 10.91 (H) 08/12/2023    HGB 12.2 08/12/2023    HCT 36.9 08/12/2023     08/12/2023     Lab Results   Component Value Date    K 4.1 02/05/2023     02/05/2023    CO2 31 02/05/2023    BUN 11 02/05/2023    CREATININE 0.61 02/05/2023    AST 14 02/05/2023    ALT 29 02/05/2023     Prenatal Labs: Reviewed      Blood type: A pos  Antibody: neg  GBS: neg  HIV: NR  Rubella: immune  Syphilis IgM/IgG: NR  HBsAg: NR  HCAb: NR  Chlamydia: neg  Gonorrhea: neg  Diabetes 1 hour screen: wnl  3 hour glucose: not indicated  Hgb: pending  Platelets: pending  >2 Midnights  INPATIENT     Signature/Title: Kayleigh Ramos MD  Date: 11/28/2023  Time: 3:41 PM

## 2023-11-28 NOTE — PLAN OF CARE
Problem: BIRTH - VAGINAL/ SECTION  Goal: Fetal and maternal status remain reassuring during the birth process  Description: INTERVENTIONS:  - Monitor vital signs  - Monitor fetal heart rate  - Monitor uterine activity  - Monitor labor progression (vaginal delivery)  - DVT prophylaxis  - Antibiotic prophylaxis  Outcome: Progressing  Goal: Emotionally satisfying birthing experience for mother/fetus  Description: Interventions:  - Assess, plan, implement and evaluate the nursing care given to the patient in labor  - Advocate the philosophy that each childbirth experience is a unique experience and support the family's chosen level of involvement and control during the labor process   - Actively participate in both the patient's and family's teaching of the birth process  - Consider cultural, [place] and age-specific factors and plan care for the patient in labor  Outcome: Progressing     Problem: Knowledge Deficit  Goal: Verbalizes understanding of labor plan  Description: Assess patient/family/caregiver's baseline knowledge level and ability to understand information. Provide education via patient/family/caregiver's preferred learning method at appropriate level of understanding. 1. Provide teaching at level of understanding. 2. Provide teaching via preferred learning method(s). Outcome: Progressing  Goal: Patient/family/caregiver demonstrates understanding of disease process, treatment plan, medications, and discharge instructions  Description: Complete learning assessment and assess knowledge base. Interventions:  - Provide teaching at level of understanding  - Provide teaching via preferred learning methods  Outcome: Progressing     Problem: Labor & Delivery  Goal: Manages discomfort  Description: Assess and monitor for signs and symptoms of discomfort. Assess patient's pain level regularly and per hospital policy. Administer medications as ordered.  Support use of nonpharmacological methods to help control pain such as distraction, imagery, relaxation, and application of heat and cold. Collaborate with interdisciplinary team and patient to determine appropriate pain management plan. 1. Include patient in decisions related to comfort. 2. Offer non-pharmacological pain management interventions. 3. Report ineffective pain management to physician. Outcome: Progressing  Goal: Patient vital signs are stable  Description: 1. Assess vital signs - vaginal delivery.   Outcome: Progressing     Problem: PAIN - ADULT  Goal: Verbalizes/displays adequate comfort level or baseline comfort level  Description: Interventions:  - Encourage patient to monitor pain and request assistance  - Assess pain using appropriate pain scale  - Administer analgesics based on type and severity of pain and evaluate response  - Implement non-pharmacological measures as appropriate and evaluate response  - Consider cultural and social influences on pain and pain management  - Notify physician/advanced practitioner if interventions unsuccessful or patient reports new pain  Outcome: Progressing     Problem: INFECTION - ADULT  Goal: Absence or prevention of progression during hospitalization  Description: INTERVENTIONS:  - Assess and monitor for signs and symptoms of infection  - Monitor lab/diagnostic results  - Monitor all insertion sites, i.e. indwelling lines, tubes, and drains  - Monitor endotracheal if appropriate and nasal secretions for changes in amount and color  - [place] appropriate cooling/warming therapies per order  - Administer medications as ordered  - Instruct and encourage patient and family to use good hand hygiene technique  - Identify and instruct in appropriate isolation precautions for identified infection/condition  Outcome: Progressing  Goal: Absence of fever/infection during neutropenic period  Description: INTERVENTIONS:  - Monitor WBC    Outcome: Progressing     Problem: SAFETY ADULT  Goal: Patient will remain free of falls  Description: INTERVENTIONS:  - Educate patient/family on patient safety including physical limitations  - Instruct patient to call for assistance with activity   - Consult OT/PT to assist with strengthening/mobility   - Keep Call bell within reach  - Keep bed low and locked with side rails adjusted as appropriate  - Keep care items and personal belongings within reach  - Initiate and maintain comfort rounds  Outcome: Progressing  Goal: Maintain or return to baseline ADL function  Description: INTERVENTIONS:  -  Assess patient's ability to carry out ADLs; assess patient's baseline for ADL function and identify physical deficits which impact ability to perform ADLs (bathing, care of mouth/teeth, toileting, grooming, dressing, etc.)  - Assess/evaluate cause of self-care deficits   - Assess range of motion  - Assess patient's mobility; develop plan if impaired  - Assess patient's need for assistive devices and provide as appropriate  - Encourage maximum independence but intervene and supervise when necessary  - Involve family in performance of ADLs  - Assess for home care needs following discharge   - Consider OT consult to assist with ADL evaluation and planning for discharge  - Provide patient education as appropriate  Outcome: Progressing  Goal: Maintains/Returns to pre admission functional level  Description: INTERVENTIONS:  - Perform AM-PAC 6 Click Basic Mobility/ Daily Activity assessment daily.  - Set and communicate daily mobility goal to care team and patient/family/caregiver.    - Collaborate with rehabilitation services on mobility goals if consulted  - Out of bed for toileting  - Record patient progress and toleration of activity level   Outcome: Progressing     Problem: DISCHARGE PLANNING  Goal: Discharge to home or other facility with appropriate resources  Description: INTERVENTIONS:  - Identify barriers to discharge w/patient and caregiver  - Arrange for needed discharge resources and transportation as appropriate  - Identify discharge learning needs (meds, wound care, etc.)  - Arrange for interpretive services to assist at discharge as needed  - Refer to Case Management Department for coordinating discharge planning if the patient needs post-hospital services based on physician/advanced practitioner order or complex needs related to functional status, cognitive ability, or social support system  Outcome: Progressing

## 2023-11-28 NOTE — ASSESSMENT & PLAN NOTE
Plan of care reviewed with patient; patient in agreement. C1 Imfinzi tolerated. Appts printed and given to patient.   Remote history  Normal echo 2021

## 2023-11-28 NOTE — ASSESSMENT & PLAN NOTE
Admit to OBGYN   Clear liquid diet   F/u T&S, CBC, RPR   IVF LR 125cc/hr   Continuous fetal monitoring and tocometry   Analgesia at maternal request   Induction plan FB, cyto, pitocin

## 2023-11-28 NOTE — OB LABOR/OXYTOCIN SAFETY PROGRESS
Labor Progress Note - Keila Asa 25 y.o. female MRN: [id number]    Unit/Bed#:  202-01 Encounter: [id number]                 Cervical Dilation: 1        Cervical Effacement: 48  Fetal Station: -2        FHR Category: 1               Vital Signs:   Vitals:    11/28/23 1509   BP: 130/81   Pulse: 104   Resp: 18   Temp: 97.9 °F (36.6 °C)   SpO2: 96%       Notes/comments:   PROCEDURE:  PIERRE BALLOON PLACEMENT     A 24F pierre with a 30cc balloon was selected, SVE was performed and cervix was located, pierre was introduced over sterile gloved hands. Balloon advanced through cervix beyond the internal cervical os. A small amount amount of sterile saline solution was instilled in the balloon to confirm placement. Placement was confirmed to be beyond the internal cervical os. A total of 60cc of sterile saline solution was placed into the balloon. Pt tolerated well. Instructions left with RN to place pierre to gravity with a 1L bag of IV fluid. Notify MD when pierre dislodged.      Vaginal cytotec placed as well     Mini Camarillo MD 11/28/2023 3:55 PM

## 2023-11-29 PROBLEM — O36.8390 NON-REASSURING ELECTRONIC FETAL MONITORING TRACING: Status: ACTIVE | Noted: 2023-11-29

## 2023-11-29 LAB
BASE EXCESS BLDCOV CALC-SCNC: -1.5 MMOL/L (ref 1–9)
HCO3 BLDCOV-SCNC: 23.2 MMOL/L (ref 12.2–28.6)
OXYHGB MFR BLDCOV: 75 %
PCO2 BLDCOV: 39.2 MM HG (ref 27–43)
PH BLDCOV: 7.39 [PH] (ref 7.19–7.49)
PO2 BLDCOV: 32.7 MM HG (ref 15–45)
SAO2 % BLDCOV: 16.5 ML/DL

## 2023-11-29 PROCEDURE — 10H07YZ INSERTION OF OTHER DEVICE INTO PRODUCTS OF CONCEPTION, VIA NATURAL OR ARTIFICIAL OPENING: ICD-10-PCS | Performed by: OBSTETRICS & GYNECOLOGY

## 2023-11-29 PROCEDURE — 59510 CESAREAN DELIVERY: CPT | Performed by: OBSTETRICS & GYNECOLOGY

## 2023-11-29 PROCEDURE — 59514 CESAREAN DELIVERY ONLY: CPT | Performed by: PHYSICIAN ASSISTANT

## 2023-11-29 PROCEDURE — 82805 BLOOD GASES W/O2 SATURATION: CPT | Performed by: OBSTETRICS & GYNECOLOGY

## 2023-11-29 RX ORDER — FENTANYL CITRATE 50 UG/ML
INJECTION, SOLUTION INTRAMUSCULAR; INTRAVENOUS AS NEEDED
Status: DISCONTINUED | OUTPATIENT
Start: 2023-11-29 | End: 2023-11-29

## 2023-11-29 RX ORDER — CLINDAMYCIN PHOSPHATE 900 MG/50ML
900 INJECTION INTRAVENOUS EVERY 8 HOURS
Status: DISCONTINUED | OUTPATIENT
Start: 2023-11-29 | End: 2023-12-01 | Stop reason: HOSPADM

## 2023-11-29 RX ORDER — NALBUPHINE HYDROCHLORIDE 10 MG/ML
5 INJECTION, SOLUTION INTRAMUSCULAR; INTRAVENOUS; SUBCUTANEOUS
Status: DISPENSED | OUTPATIENT
Start: 2023-11-29 | End: 2023-11-30

## 2023-11-29 RX ORDER — DIPHENHYDRAMINE HYDROCHLORIDE 50 MG/ML
INJECTION INTRAMUSCULAR; INTRAVENOUS AS NEEDED
Status: DISCONTINUED | OUTPATIENT
Start: 2023-11-29 | End: 2023-11-29

## 2023-11-29 RX ORDER — LIDOCAINE HCL/EPINEPHRINE/PF 2%-1:200K
VIAL (ML) INJECTION AS NEEDED
Status: DISCONTINUED | OUTPATIENT
Start: 2023-11-29 | End: 2023-11-29

## 2023-11-29 RX ORDER — ENOXAPARIN SODIUM 100 MG/ML
40 INJECTION SUBCUTANEOUS
Status: DISCONTINUED | OUTPATIENT
Start: 2023-11-30 | End: 2023-12-01 | Stop reason: HOSPADM

## 2023-11-29 RX ORDER — OXYTOCIN/RINGER'S LACTATE 30/500 ML
PLASTIC BAG, INJECTION (ML) INTRAVENOUS CONTINUOUS PRN
Status: DISCONTINUED | OUTPATIENT
Start: 2023-11-29 | End: 2023-11-29

## 2023-11-29 RX ORDER — IBUPROFEN 600 MG/1
600 TABLET ORAL EVERY 6 HOURS
Status: DISCONTINUED | OUTPATIENT
Start: 2023-11-30 | End: 2023-11-30

## 2023-11-29 RX ORDER — OXYCODONE HYDROCHLORIDE 10 MG/1
10 TABLET ORAL EVERY 4 HOURS PRN
Status: DISCONTINUED | OUTPATIENT
Start: 2023-11-30 | End: 2023-12-01 | Stop reason: HOSPADM

## 2023-11-29 RX ORDER — OXYCODONE HYDROCHLORIDE 5 MG/1
5 TABLET ORAL EVERY 4 HOURS PRN
Status: DISCONTINUED | OUTPATIENT
Start: 2023-11-29 | End: 2023-12-01 | Stop reason: HOSPADM

## 2023-11-29 RX ORDER — KETOROLAC TROMETHAMINE 30 MG/ML
INJECTION, SOLUTION INTRAMUSCULAR; INTRAVENOUS AS NEEDED
Status: DISCONTINUED | OUTPATIENT
Start: 2023-11-29 | End: 2023-11-29

## 2023-11-29 RX ORDER — NALOXONE HYDROCHLORIDE 0.4 MG/ML
0.1 INJECTION, SOLUTION INTRAMUSCULAR; INTRAVENOUS; SUBCUTANEOUS
Status: ACTIVE | OUTPATIENT
Start: 2023-11-29 | End: 2023-11-30

## 2023-11-29 RX ORDER — KETOROLAC TROMETHAMINE 30 MG/ML
30 INJECTION, SOLUTION INTRAMUSCULAR; INTRAVENOUS EVERY 6 HOURS SCHEDULED
Status: DISCONTINUED | OUTPATIENT
Start: 2023-11-29 | End: 2023-11-30

## 2023-11-29 RX ORDER — ACETAMINOPHEN 325 MG/1
650 TABLET ORAL EVERY 6 HOURS PRN
Status: DISCONTINUED | OUTPATIENT
Start: 2023-11-29 | End: 2023-11-29

## 2023-11-29 RX ORDER — MORPHINE SULFATE 0.5 MG/ML
INJECTION, SOLUTION EPIDURAL; INTRATHECAL; INTRAVENOUS AS NEEDED
Status: DISCONTINUED | OUTPATIENT
Start: 2023-11-29 | End: 2023-11-29

## 2023-11-29 RX ORDER — DEXAMETHASONE SODIUM PHOSPHATE 10 MG/ML
INJECTION, SOLUTION INTRAMUSCULAR; INTRAVENOUS AS NEEDED
Status: DISCONTINUED | OUTPATIENT
Start: 2023-11-29 | End: 2023-11-29

## 2023-11-29 RX ORDER — OXYCODONE HYDROCHLORIDE 5 MG/1
5 TABLET ORAL EVERY 4 HOURS PRN
Status: DISCONTINUED | OUTPATIENT
Start: 2023-11-30 | End: 2023-12-01 | Stop reason: HOSPADM

## 2023-11-29 RX ORDER — KETOROLAC TROMETHAMINE 30 MG/ML
30 INJECTION, SOLUTION INTRAMUSCULAR; INTRAVENOUS EVERY 6 HOURS
Status: DISCONTINUED | OUTPATIENT
Start: 2023-11-29 | End: 2023-11-29

## 2023-11-29 RX ORDER — EPHEDRINE SULFATE 50 MG/ML
INJECTION INTRAVENOUS AS NEEDED
Status: DISCONTINUED | OUTPATIENT
Start: 2023-11-29 | End: 2023-11-29

## 2023-11-29 RX ORDER — ACETAMINOPHEN 325 MG/1
650 TABLET ORAL EVERY 6 HOURS SCHEDULED
Status: COMPLETED | OUTPATIENT
Start: 2023-11-29 | End: 2023-11-30

## 2023-11-29 RX ORDER — OXYTOCIN/RINGER'S LACTATE 30/500 ML
1-30 PLASTIC BAG, INJECTION (ML) INTRAVENOUS CONTINUOUS
Status: ACTIVE | OUTPATIENT
Start: 2023-11-29 | End: 2023-11-29

## 2023-11-29 RX ORDER — DIPHENHYDRAMINE HYDROCHLORIDE 50 MG/ML
25 INJECTION INTRAMUSCULAR; INTRAVENOUS EVERY 6 HOURS PRN
Status: DISCONTINUED | OUTPATIENT
Start: 2023-11-29 | End: 2023-12-01 | Stop reason: HOSPADM

## 2023-11-29 RX ORDER — ACETAMINOPHEN 325 MG/1
650 TABLET ORAL EVERY 6 HOURS PRN
Status: DISCONTINUED | OUTPATIENT
Start: 2023-11-30 | End: 2023-12-01 | Stop reason: HOSPADM

## 2023-11-29 RX ORDER — HYDROMORPHONE HCL/PF 1 MG/ML
0.5 SYRINGE (ML) INJECTION EVERY 2 HOUR PRN
Status: DISCONTINUED | OUTPATIENT
Start: 2023-11-29 | End: 2023-12-01 | Stop reason: HOSPADM

## 2023-11-29 RX ORDER — CLINDAMYCIN PHOSPHATE 900 MG/50ML
900 INJECTION INTRAVENOUS ONCE
Status: DISCONTINUED | OUTPATIENT
Start: 2023-11-29 | End: 2023-11-29

## 2023-11-29 RX ORDER — DOCUSATE SODIUM 100 MG/1
100 CAPSULE, LIQUID FILLED ORAL 2 TIMES DAILY
Status: DISCONTINUED | OUTPATIENT
Start: 2023-11-29 | End: 2023-12-01 | Stop reason: HOSPADM

## 2023-11-29 RX ADMIN — ONDANSETRON 4 MG: 2 INJECTION INTRAMUSCULAR; INTRAVENOUS at 10:40

## 2023-11-29 RX ADMIN — AZITHROMYCIN MONOHYDRATE 500 MG: 500 INJECTION, POWDER, LYOPHILIZED, FOR SOLUTION INTRAVENOUS at 10:31

## 2023-11-29 RX ADMIN — DOCUSATE SODIUM 100 MG: 100 CAPSULE, LIQUID FILLED ORAL at 17:50

## 2023-11-29 RX ADMIN — ACETAMINOPHEN 650 MG: 325 TABLET, FILM COATED ORAL at 17:49

## 2023-11-29 RX ADMIN — SODIUM CHLORIDE, SODIUM LACTATE, POTASSIUM CHLORIDE, AND CALCIUM CHLORIDE 125 ML/HR: .6; .31; .03; .02 INJECTION, SOLUTION INTRAVENOUS at 06:53

## 2023-11-29 RX ADMIN — KETOROLAC TROMETHAMINE 30 MG: 30 INJECTION, SOLUTION INTRAMUSCULAR; INTRAVENOUS at 11:14

## 2023-11-29 RX ADMIN — FENTANYL CITRATE 100 MCG: 50 INJECTION INTRAMUSCULAR; INTRAVENOUS at 10:41

## 2023-11-29 RX ADMIN — LIDOCAINE HYDROCHLORIDE,EPINEPHRINE BITARTRATE 5 ML: 20; .005 INJECTION, SOLUTION EPIDURAL; INFILTRATION; INTRACAUDAL; PERINEURAL at 10:39

## 2023-11-29 RX ADMIN — GENTAMICIN SULFATE 312 MG: 40 INJECTION, SOLUTION INTRAMUSCULAR; INTRAVENOUS at 10:54

## 2023-11-29 RX ADMIN — LIDOCAINE HYDROCHLORIDE,EPINEPHRINE BITARTRATE 5 ML: 20; .005 INJECTION, SOLUTION EPIDURAL; INFILTRATION; INTRACAUDAL; PERINEURAL at 10:31

## 2023-11-29 RX ADMIN — EPHEDRINE SULFATE 10 MG: 50 INJECTION INTRAVENOUS at 11:46

## 2023-11-29 RX ADMIN — SODIUM CHLORIDE, SODIUM LACTATE, POTASSIUM CHLORIDE, AND CALCIUM CHLORIDE 999.9 ML/HR: .6; .31; .03; .02 INJECTION, SOLUTION INTRAVENOUS at 10:15

## 2023-11-29 RX ADMIN — SODIUM CHLORIDE, SODIUM LACTATE, POTASSIUM CHLORIDE, AND CALCIUM CHLORIDE 125 ML/HR: .6; .31; .03; .02 INJECTION, SOLUTION INTRAVENOUS at 14:21

## 2023-11-29 RX ADMIN — DIPHENHYDRAMINE HYDROCHLORIDE 25 MG: 50 INJECTION, SOLUTION INTRAMUSCULAR; INTRAVENOUS at 11:11

## 2023-11-29 RX ADMIN — PHENYLEPHRINE HYDROCHLORIDE 50 MCG/MIN: 10 INJECTION INTRAVENOUS at 10:35

## 2023-11-29 RX ADMIN — ACETAMINOPHEN 650 MG: 325 TABLET, FILM COATED ORAL at 07:27

## 2023-11-29 RX ADMIN — SODIUM CHLORIDE, SODIUM LACTATE, POTASSIUM CHLORIDE, AND CALCIUM CHLORIDE: .6; .31; .03; .02 INJECTION, SOLUTION INTRAVENOUS at 11:27

## 2023-11-29 RX ADMIN — KETOROLAC TROMETHAMINE 30 MG: 30 INJECTION, SOLUTION INTRAMUSCULAR; INTRAVENOUS at 17:05

## 2023-11-29 RX ADMIN — ROPIVACAINE HYDROCHLORIDE: 2 INJECTION, SOLUTION EPIDURAL; INFILTRATION at 07:14

## 2023-11-29 RX ADMIN — Medication 250 MILLI-UNITS/MIN: at 11:00

## 2023-11-29 RX ADMIN — DEXAMETHASONE SODIUM PHOSPHATE 10 MG: 10 INJECTION INTRAMUSCULAR; INTRAVENOUS at 10:40

## 2023-11-29 RX ADMIN — MORPHINE SULFATE 1.5 MG: 0.5 INJECTION, SOLUTION EPIDURAL; INTRATHECAL; INTRAVENOUS at 11:19

## 2023-11-29 RX ADMIN — ACETAMINOPHEN 650 MG: 325 TABLET, FILM COATED ORAL at 12:54

## 2023-11-29 RX ADMIN — Medication 250 MILLI-UNITS/MIN: at 10:51

## 2023-11-29 RX ADMIN — CLINDAMYCIN PHOSPHATE 900 MG: 900 INJECTION, SOLUTION INTRAVENOUS at 10:30

## 2023-11-29 RX ADMIN — ONDANSETRON 4 MG: 2 INJECTION INTRAMUSCULAR; INTRAVENOUS at 07:33

## 2023-11-29 RX ADMIN — CLINDAMYCIN PHOSPHATE 900 MG: 900 INJECTION, SOLUTION INTRAVENOUS at 19:08

## 2023-11-29 RX ADMIN — MORPHINE SULFATE 3.5 MG: 0.5 INJECTION EPIDURAL; INTRATHECAL; INTRAVENOUS at 11:16

## 2023-11-29 RX ADMIN — NALBUPHINE HYDROCHLORIDE 5 MG: 10 INJECTION, SOLUTION INTRAMUSCULAR; INTRAVENOUS; SUBCUTANEOUS at 15:38

## 2023-11-29 RX ADMIN — EPHEDRINE SULFATE 5 MG: 50 INJECTION INTRAVENOUS at 11:44

## 2023-11-29 RX ADMIN — LIDOCAINE HYDROCHLORIDE,EPINEPHRINE BITARTRATE 10 ML: 20; .005 INJECTION, SOLUTION EPIDURAL; INFILTRATION; INTRACAUDAL; PERINEURAL at 10:34

## 2023-11-29 NOTE — OB LABOR/OXYTOCIN SAFETY PROGRESS
Labor Progress Note - Anabel Hurd 25 y.o. female MRN: [id number]    Unit/Bed#: -01 Encounter: [id number]       Contraction Frequency (minutes): irritability, irregular  Contraction Quality: Not applicable  Tachysystole: No   Cervical Dilation: 4-5        Cervical Effacement: 60  Fetal Station: -2  Baseline Rate: 130 bpm  Fetal Heart Rate: 152 BPM  FHR Category: 1               Vital Signs:   Vitals:    11/28/23 1900   BP: 109/64   Pulse:    Resp:    Temp:    SpO2:        Notes/comments:   SVE as above. Category I tracing. 4 hours since cytotec, will begin pitocin. D/w Dr. Elder Bahena.       Cristian Fischer MD 11/28/2023 7:46 PM

## 2023-11-29 NOTE — PLAN OF CARE
Problem: BIRTH - VAGINAL/ SECTION  Goal: Fetal and maternal status remain reassuring during the birth process  Description: INTERVENTIONS:  - Monitor vital signs  - Monitor fetal heart rate  - Monitor uterine activity  - Monitor labor progression (vaginal delivery)  - DVT prophylaxis  - Antibiotic prophylaxis  Outcome: Progressing  Goal: Emotionally satisfying birthing experience for mother/fetus  Description: Interventions:  - Assess, plan, implement and evaluate the nursing care given to the patient in labor  - Advocate the philosophy that each childbirth experience is a unique experience and support the family's chosen level of involvement and control during the labor process   - Actively participate in both the patient's and family's teaching of the birth process  - Consider cultural, [place] and age-specific factors and plan care for the patient in labor  Outcome: Progressing     Problem: Knowledge Deficit  Goal: Verbalizes understanding of labor plan  Description: Assess patient/family/caregiver's baseline knowledge level and ability to understand information. Provide education via patient/family/caregiver's preferred learning method at appropriate level of understanding. 1. Provide teaching at level of understanding. 2. Provide teaching via preferred learning method(s). Outcome: Progressing  Goal: Patient/family/caregiver demonstrates understanding of disease process, treatment plan, medications, and discharge instructions  Description: Complete learning assessment and assess knowledge base. Interventions:  - Provide teaching at level of understanding  - Provide teaching via preferred learning methods  Outcome: Progressing     Problem: Labor & Delivery  Goal: Manages discomfort  Description: Assess and monitor for signs and symptoms of discomfort. Assess patient's pain level regularly and per hospital policy. Administer medications as ordered.  Support use of nonpharmacological methods to help control pain such as distraction, imagery, relaxation, and application of heat and cold. Collaborate with interdisciplinary team and patient to determine appropriate pain management plan. 1. Include patient in decisions related to comfort. 2. Offer non-pharmacological pain management interventions. 3. Report ineffective pain management to physician. Outcome: Progressing  Goal: Patient vital signs are stable  Description: 1. Assess vital signs - vaginal delivery.   Outcome: Progressing     Problem: PAIN - ADULT  Goal: Verbalizes/displays adequate comfort level or baseline comfort level  Description: Interventions:  - Encourage patient to monitor pain and request assistance  - Assess pain using appropriate pain scale  - Administer analgesics based on type and severity of pain and evaluate response  - Implement non-pharmacological measures as appropriate and evaluate response  - Consider cultural and social influences on pain and pain management  - Notify physician/advanced practitioner if interventions unsuccessful or patient reports new pain  Outcome: Progressing     Problem: INFECTION - ADULT  Goal: Absence or prevention of progression during hospitalization  Description: INTERVENTIONS:  - Assess and monitor for signs and symptoms of infection  - Monitor lab/diagnostic results  - Monitor all insertion sites, i.e. indwelling lines, tubes, and drains  - Monitor endotracheal if appropriate and nasal secretions for changes in amount and color  - [place] appropriate cooling/warming therapies per order  - Administer medications as ordered  - Instruct and encourage patient and family to use good hand hygiene technique  - Identify and instruct in appropriate isolation precautions for identified infection/condition  Outcome: Progressing  Goal: Absence of fever/infection during neutropenic period  Description: INTERVENTIONS:  - Monitor WBC    Outcome: Progressing     Problem: SAFETY ADULT  Goal: Patient will remain free of falls  Description: INTERVENTIONS:  - Educate patient/family on patient safety including physical limitations  - Instruct patient to call for assistance with activity   - Consult OT/PT to assist with strengthening/mobility   - Keep Call bell within reach  - Keep bed low and locked with side rails adjusted as appropriate  - Keep care items and personal belongings within reach  - Initiate and maintain comfort rounds  - Make Fall Risk Sign visible to staff  - Offer Toileting every  Hours, in advance of need  - Initiate/Maintain alarm  - Obtain necessary fall risk anagement equipment:   - Apply yellow socks and bracelet for high fall risk patients  - Consider moving patient to room near nurses station  Outcome: Progressing  Goal: Maintain or return to baseline ADL function  Description: INTERVENTIONS:  -  Assess patient's ability to carry out ADLs; assess patient's baseline for ADL function and identify physical deficits which impact ability to perform ADLs (bathing, care of mouth/teeth, toileting, grooming, dressing, etc.)  - Assess/evaluate cause of self-care deficits   - Assess range of motion  - Assess patient's mobility; develop plan if impaired  - Assess patient's need for assistive devices and provide as appropriate  - Encourage maximum independence but intervene and supervise when necessary  - Involve family in performance of ADLs  - Assess for home care needs following discharge   - Consider OT consult to assist with ADL evaluation and planning for discharge  - Provide patient education as appropriate  Outcome: Progressing  Goal: Maintains/Returns to pre admission functional level  Description: INTERVENTIONS:  - Perform AM-PAC 6 Click Basic Mobility/ Daily Activity assessment daily.  - Set and communicate daily mobility goal to care team and patient/family/caregiver. - Collaborate with rehabilitation services on mobility goals if consulted  - Perform Range of Motion  times a day.   - Reposition patient every  hours.   - Dangle patient  times a day  - Stand patient  times a day  - Ambulate patient  times a day  - Out of bed to chair  times a day   - Out of bed for meals  times a day  - Out of bed for toileting  - Record patient progress and toleration of activity level   Outcome: Progressing     Problem: DISCHARGE PLANNING  Goal: Discharge to home or other facility with appropriate resources  Description: INTERVENTIONS:  - Identify barriers to discharge w/patient and caregiver  - Arrange for needed discharge resources and transportation as appropriate  - Identify discharge learning needs (meds, wound care, etc.)  - Arrange for interpretive services to assist at discharge as needed  - Refer to Case Management Department for coordinating discharge planning if the patient needs post-hospital services based on physician/advanced practitioner order or complex needs related to functional status, cognitive ability, or social support system  Outcome: Progressing

## 2023-11-29 NOTE — PLAN OF CARE
Problem: BIRTH - VAGINAL/ SECTION  Goal: Fetal and maternal status remain reassuring during the birth process  Description: INTERVENTIONS:  - Monitor vital signs  - Monitor fetal heart rate  - Monitor uterine activity  - Monitor labor progression (vaginal delivery)  - DVT prophylaxis  - Antibiotic prophylaxis  Outcome: Progressing  Goal: Emotionally satisfying birthing experience for mother/fetus  Description: Interventions:  - Assess, plan, implement and evaluate the nursing care given to the patient in labor  - Advocate the philosophy that each childbirth experience is a unique experience and support the family's chosen level of involvement and control during the labor process   - Actively participate in both the patient's and family's teaching of the birth process  - Consider cultural, Catholic and age-specific factors and plan care for the patient in labor  Outcome: Progressing     Problem: Knowledge Deficit  Goal: Verbalizes understanding of labor plan  Description: Assess patient/family/caregiver's baseline knowledge level and ability to understand information. Provide education via patient/family/caregiver's preferred learning method at appropriate level of understanding. 1. Provide teaching at level of understanding. 2. Provide teaching via preferred learning method(s). Outcome: Progressing  Goal: Patient/family/caregiver demonstrates understanding of disease process, treatment plan, medications, and discharge instructions  Description: Complete learning assessment and assess knowledge base. Interventions:  - Provide teaching at level of understanding  - Provide teaching via preferred learning methods  Outcome: Progressing     Problem: Labor & Delivery  Goal: Manages discomfort  Description: Assess and monitor for signs and symptoms of discomfort. Assess patient's pain level regularly and per hospital policy. Administer medications as ordered.  Support use of nonpharmacological methods to help control pain such as distraction, imagery, relaxation, and application of heat and cold. Collaborate with interdisciplinary team and patient to determine appropriate pain management plan. 1. Include patient in decisions related to comfort. 2. Offer non-pharmacological pain management interventions. 3. Report ineffective pain management to physician. Outcome: Progressing  Goal: Patient vital signs are stable  Description: 1. Assess vital signs - vaginal delivery.   Outcome: Progressing     Problem: PAIN - ADULT  Goal: Verbalizes/displays adequate comfort level or baseline comfort level  Description: Interventions:  - Encourage patient to monitor pain and request assistance  - Assess pain using appropriate pain scale  - Administer analgesics based on type and severity of pain and evaluate response  - Implement non-pharmacological measures as appropriate and evaluate response  - Consider cultural and social influences on pain and pain management  - Notify physician/advanced practitioner if interventions unsuccessful or patient reports new pain  Outcome: Progressing     Problem: INFECTION - ADULT  Goal: Absence or prevention of progression during hospitalization  Description: INTERVENTIONS:  - Assess and monitor for signs and symptoms of infection  - Monitor lab/diagnostic results  - Monitor all insertion sites, i.e. indwelling lines, tubes, and drains  - Monitor endotracheal if appropriate and nasal secretions for changes in amount and color  - [place] appropriate cooling/warming therapies per order  - Administer medications as ordered  - Instruct and encourage patient and family to use good hand hygiene technique  - Identify and instruct in appropriate isolation precautions for identified infection/condition  Outcome: Progressing  Goal: Absence of fever/infection during neutropenic period  Description: INTERVENTIONS:  - Monitor WBC    Outcome: Progressing     Problem: SAFETY ADULT  Goal: Patient will remain free of falls  Description: INTERVENTIONS:  - Educate patient/family on patient safety including physical limitations  - Instruct patient to call for assistance with activity   - Consult OT/PT to assist with strengthening/mobility   - Keep Call bell within reach  - Keep bed low and locked with side rails adjusted as appropriate  - Keep care items and personal belongings within reach  - Initiate and maintain comfort rounds  - Make Fall Risk Sign visible to staff  - Apply yellow socks and bracelet for high fall risk patients  - Consider moving patient to room near nurses station  Outcome: Progressing  Goal: Maintain or return to baseline ADL function  Description: INTERVENTIONS:  -  Assess patient's ability to carry out ADLs; assess patient's baseline for ADL function and identify physical deficits which impact ability to perform ADLs (bathing, care of mouth/teeth, toileting, grooming, dressing, etc.)  - Assess/evaluate cause of self-care deficits   - Assess range of motion  - Assess patient's mobility; develop plan if impaired  - Assess patient's need for assistive devices and provide as appropriate  - Encourage maximum independence but intervene and supervise when necessary  - Involve family in performance of ADLs  - Assess for home care needs following discharge   - Consider OT consult to assist with ADL evaluation and planning for discharge  - Provide patient education as appropriate  Outcome: Progressing  Goal: Maintains/Returns to pre admission functional level  Description: INTERVENTIONS:  - Perform AM-PAC 6 Click Basic Mobility/ Daily Activity assessment daily.  - Set and communicate daily mobility goal to care team and patient/family/caregiver.    - Collaborate with rehabilitation services on mobility goals if consulted  - Out of bed for toileting  - Record patient progress and toleration of activity level   Outcome: Progressing     Problem: DISCHARGE PLANNING  Goal: Discharge to home or other facility with appropriate resources  Description: INTERVENTIONS:  - Identify barriers to discharge w/patient and caregiver  - Arrange for needed discharge resources and transportation as appropriate  - Identify discharge learning needs (meds, wound care, etc.)  - Arrange for interpretive services to assist at discharge as needed  - Refer to Case Management Department for coordinating discharge planning if the patient needs post-hospital services based on physician/advanced practitioner order or complex needs related to functional status, cognitive ability, or social support system  Outcome: Progressing

## 2023-11-29 NOTE — OP NOTE
OPERATIVE REPORT  PATIENT NAME: Sharon Thayer    :  1995  MRN: [id number]  Pt Location: [facility] L&D OR ROOM 02    SURGERY DATE: 2023    Surgeon(s) and Role:     * Zacarias Pinon MD - Primary     * Malathi Quintana PA-C - Assisting    No Qualified Resident Available to Assist    Malathi Quintana PA-C was present and participated in the procedure, which was medically necessary to assist with retraction and suturing. Preop Diagnosis:  Non-reassuring electronic fetal monitoring tracing [O36.8390]  Arrest of dilation, delivered, current hospitalization [O62.1]    Post-Op Diagnosis Codes:     * Non-reassuring electronic fetal monitoring tracing [O36.8390]     * Arrest of dilation, delivered, current hospitalization [O62.1]    Procedure(s) (LRB):   SECTION () (N/A)    Specimen(s):  ID Type Source Tests Collected by Time Destination   A :  Cord Blood Cord BLOOD GAS, VENOUS, CORD, BLOOD GAS, ARTERIAL, CORD Zacarias Pinon MD 2023 1051    B :  Tissue (Placenta on Hold) OB Only Placenta PLACENTA IN STORAGE Zacarias Pinon MD 2023 1054        Surgical QBL:  Surgical QBL (mL): 1257 mL      Drains:  Urethral Catheter 16 Fr.  (Active)   Goal for Removal Voiding trial when ambulation improves 23 0135   Site Assessment Clean;Skin intact 23 0700   Collection Container Standard drainage bag 23 0700   Securement Method Securing device (Describe) 23 0700   Output (mL) 250 mL 23 0950   Number of days: 0       Anesthesia Type:   * No anesthesia type entered *    Operative Indications:  Non-reassuring electronic fetal monitoring tracing [O36.8390]  Arrest of dilation, delivered, current hospitalization [O62.1]       Monty Group Classification System:  No Multiple pregnancy, No Transverse or oblique lie, No Breech lie, Gestational age is > or =37 weeks, Nulliparous, Labor induced +  is MONTY GROUP 2a    Operative Findings:  Viable male  weighing 7'13oz with apgars of 9 and 9 at 1 and 5 minutes delivered via c/s at 10:51. Normal appearing adnexae b/l  Normal placenta, intact with 3VC    Complications:   None    Brief History. Patient was undergoing elective IOL. She arrested at 9cm for 3 hours and began to have significant cervical edema. Baby was palpably OP. Fetal tracing cat II with deep, recurrent variable decelerations. Recommendation for 1LTCS for arrest of dilation and non-reassuring fetal tracing. Procedure and Technique:  The patient was taken to the operating room where a time out was performed to confirm correct patient and correct procedure. epidural was adequately established and clindamycin and gentamicin were given for preoperative prophylaxis. The patient was then placed in a supine position with a left lateral tilt. Hernandez catheter had already been placed  in sterile fashion. Fetal heart tones were noted to be normal. The patient was then prepped and draped in the usual sterile fashion for a  skin incision. A  pfannenstielincision was made in the skin with a surgical scalpel. Sharp and blunt dissection was used to reach the level of the rectus fascia. Bovie electrocautery was utilitzed for hemostasis during this process. The fascia was incised transversely at the midline and the fascial incision was extended bilaterally using sharp dissection. The superior edge of the fascial incision was grasped with Kocher clamps, tented up and the underlying rectus muscles were dissected off bluntly and sharply using the scissors. The inferior edge of the fascial incision was then dissection off the rectus muscles with blunt and sharp dissection. The rectus muscles were then divided at the midline. The peritoneum was identified, tented up at its upper margin taking care to avoid the bladder, and then entered with blunt dissection.  The peritoneal incision and rectus muscle were extended bilaterally bluntly with gentle traction. The bladder blade was inserted. Then, a transverse incision was made in the lower uterine segment using a new surgical blade. The uterine incision was extended cephalad and caudal using blunt dissection. The amniotic sac was entered bluntly and the amniotic fluid was noted to be Clear. The surgeon's hand was placed into the uterine cavity. The fetus was noted to be in cephalic presentation, OP presentation, and the presenting part was grasped and delivered through the uterine incision with the assistance of fundal pressure. No nuchal cord was identified. The infant's oral and nasal passages were bulb suctioned. After delivery the cord was doubly clamped and cut. The infant was then passed off the table to the awaiting  staff. Venous and arterial blood gas, cord blood, and portion of cord was obtained for analysis and routine blood testing. The placenta was then delivered intact. The placenta was noted to be intact with a centrally inserted three-vessel cord. Oxytocin was administered by IV infusion to enhance uterine contraction. The uterus was exteriorized and cleared of all clots and debris by blunt curretage with a moist lap sponge. The uterine incision was reapproximated using a 0 Vicryl in a running locked fashion. A second vertical imbricating stitch with 0 Vicryl was applied. The uterine incision was examined and noted to be hemostatic. The posterior cul-de-sac was cleared of all clots. The uterus was replaced into the abdomen and the pericolic gutters were cleared of all clots. The uterine incision was once again reexamined and noted to be hemostatic. The fascia was re approximated using 3-0 Vicryl in a running nonlocked fashion. The subcutaneous tissue was irrigated and cleared of all clots and debris. Good hemostasis was noted was achieved with Bovie electrocautery. The skin incision was closed with 4-0 monocryl. Good hemostasis was noted.   ABD pads, gauze, sterile gloves, and Telfa pads were placed sterilely placed on top of the skin incision as a surgical dressing. All needle, sponge, and instrument counts were noted to be correct x 2 at the end of the procedure. The patient was then cleansed and transferred to the recovery room. Overall, the patient tolerated the procedure well and is currently in stable condition in the PACU with her . Dr. Mariajose Oshea was present for the entire procedure. I was present for the entire procedure.     Patient Disposition:  PACU         SIGNATURE: Braxton Chapin MD  DATE: 2023  TIME: 1:28 PM

## 2023-11-29 NOTE — OB LABOR/OXYTOCIN SAFETY PROGRESS
Oxytocin Safety Progress Check Note - Elizabeth Webb 25 y.o. female MRN: [id number]    Unit/Bed#: -01 Encounter: [id number]    Dose (karsten-units/min) Oxytocin: 4 karsten-units/min  Contraction Frequency (minutes): 1-3.5  Contraction Quality: Moderate, Strong  Tachysystole: No   Cervical Dilation: 7-8        Cervical Effacement: 80  Fetal Station: -1  Baseline Rate: 135 bpm  Fetal Heart Rate: 142 BPM  FHR Category: 2     Provider Notified: Yes  Provider Name: Dr. Brent Raman      Vital Signs:   Vitals:    11/29/23 0100   BP: 99/55   Pulse:    Resp: 18   Temp: 99.1 °F (37.3 °C)   SpO2:        Notes/comments:   SVE unchanged, patient comfortable with epidural. Category II tracing for intermittent late decelerations. Patient getting pierre catheter now and then will reposition and resuscitate as needed.  D/w Dr. Kandis Green MD 11/29/2023 1:28 AM

## 2023-11-29 NOTE — ANESTHESIA PREPROCEDURE EVALUATION
Procedure:  LABOR ANALGESIA    Relevant Problems   CARDIO   (+) Migraines   (+) RBBB      GYN   (+) 40 weeks gestation of pregnancy   (+) Encounter for supervision of normal first pregnancy in third trimester      NEURO/PSYCH   (+) Anxiety disorder   (+) Migraines      PULMONARY   (+) Mild asthma        Physical Exam    Airway    Mallampati score: II  TM Distance: >3 FB  Neck ROM: full     Dental   No notable dental hx     Cardiovascular      Pulmonary      Other Findings  post-pubertal.      Anesthesia Plan  ASA Score- 2     Anesthesia Type- epidural with ASA Monitors. Additional Monitors:     Airway Plan:            Plan Factors-Exercise tolerance (METS): >4 METS. Chart reviewed. Existing labs reviewed. Induction-     Postoperative Plan-     Informed Consent- Anesthetic plan and risks discussed with patient. I personally reviewed this patient with the CRNA. Discussed and agreed on the Anesthesia Plan with the CRNA. Moy Walker

## 2023-11-29 NOTE — LACTATION NOTE
This note was copied from a baby's chart. CONSULT - LACTATION  Baby Boy Santiago Trevino 0 days male MRN: [id number]    [address] [facility] NURSERY Room / Bed: (N)/(N) Encounter: [id number]    Maternal Information     MOTHER:  Barney Christianson  Maternal Age: 25 y.o.   OB History: # 1 - Date: 23, Sex: Male, Weight: 3544 g (7 lb 13 oz), GA: 40w1d, Delivery: , Low Transverse, Apgar1: 9, Apgar5: 9, Living: Living, Birth Comments: None   Previouse breast reduction surgery? No    Lactation history:   Has patient previously breast fed: No   How long had patient previously breast fed:     Previous breast feeding complications:       Past Surgical History:   Procedure Laterality Date    WISDOM TOOTH EXTRACTION          Birth information:  YOB: 2025   Time of birth: 10:51 AM   Sex: male   Delivery type: , Low Transverse   Birth Weight: 3544 g (7 lb 13 oz)   Percent of Weight Change: 0%     Gestational Age: 45w2d   [unfilled]    Assessment     Breast and nipple assessment:  bilateral small areolas with doorknob nipples.  Assessment: normal assessment    Feeding assessment: feeding well  LATCH:  Latch: Grasps breast, tongue down, lips flanged, rhythmic sucking   Audible Swallowing: Spontaneous and intermittent (24 hours old)   Type of Nipple: Everted (After stimulation)   Comfort (Breast/Nipple): Soft/non-tender   Hold (Positioning): Partial assist, teach one side, mother does other, staff holds   [facility] Score: 9           23 1500   Lactation Consultation   Reason for Consult 20;20 min   Lactation Consultant Total Time 36   Maternal Information   Has mother  before? No   Infant to breast within first hour of birth?  No   Breastfeeding Delayed Due to Maternal status   LATCH Documentation   Latch 2   Audible Swallowing 2   Type of Nipple 2   Comfort (Breast/Nipple) 2   Hold (Positioning) 1   LATCH Score 9   Having latch problems? No   Position(s) Used Cross Cradle;Laid Back   Breasts/Nipples   Left Breast Soft   Right Breast Soft   Left Nipple Everted; Other (Comment)  (door knob)   Right Nipple Everted; Other (Comment)  (door knob)   Intervention Hand expression   Breastfeeding Progress Not yet established   Breast Pump   Pump 3  (has Constanza and Mom Cozy)   Patient Follow-Up   Lactation Consult Status 2   Follow-Up Type Inpatient;Call as needed   Other OB Lactation Documentation    Additional Problem Noted Prior to arrival baby on left breast for 40 minutes, Helped mom position into cross cradle on right breast. Baby actively sucking with swallows. Pulled self off breast and brought S2S with mom. Reviewed hand expression. Education on feeding/fullness cues. (RSB booklet reviewed. D/C booklet at bedside.)     Feeding recommendations:  breast feed on demand    Information on hand expression given. Discussed benefits of knowing how to manually express breast including stimulating milk supply, softening nipple for latch and evacuating breast in the event of engorgement. Provided demonstration, education and support of deep latch to breast by placing the nipple to the nose, dragging down to chin to achieve a wide latch. Bring baby to the breast, not breast to baby. Move your shoulders down and away from your ears. Look for ear, shoulder, hip alignment. Baby's upper and lower lip should be flanged on the breast.    Met with mother. Provided mother with Ready, Set, Baby booklet which contained information on:  Hand expression with access to QR codes to review hand expression. Positioning and latch reviewed as well as showing images of other feeding positions. Discussed the properties of a good latch in any position.    Feeding on cue and what that means for recognizing infant's hunger, s/s that baby is getting enough milk and some s/s that breastfeeding dyad may need further help  Skin to Skin contact an benefits to mom and baby  Avoidance of pacifiers for the first month discussed. Gave information on common concerns, what to expect the first few weeks after delivery, preparing for other caregivers, and how partners can help. Resources for support also provided. Encouraged parents to call for assistance, questions, and concerns about breastfeeding. Extension provided.       Adriana Matos 11/29/2023 3:48 PM

## 2023-11-29 NOTE — ANESTHESIA POSTPROCEDURE EVALUATION
Post-Op Assessment Note    CV Status:  Stable  Pain Score: 0    Pain management: adequate      Post-op block assessment: catheter intact and no complications   Mental Status:  Alert and awake   Hydration Status:  Euvolemic   PONV Controlled:  Controlled   Airway Patency:  Patent     Post Op Vitals Reviewed: Yes    No anethesia notable event occurred.     Staff: CRNA               BP   100/50   Temp   99.9   Pulse  98   Resp   18   SpO2   95

## 2023-11-29 NOTE — OB LABOR/OXYTOCIN SAFETY PROGRESS
Oxytocin Safety Progress Check Note - Waldo Bardales 25 y.o. female MRN: [id number]    Unit/Bed#: -01 Encounter: [id number]    Dose (karsten-units/min) Oxytocin: 10 karsten-units/min  Contraction Frequency (minutes): 1.5-3  Contraction Quality: Moderate, Strong  Tachysystole: No   Cervical Dilation: 7-8        Cervical Effacement: 80  Fetal Station: 0  Baseline Rate: 135 bpm  Fetal Heart Rate: 145 BPM  FHR Category: 1     Provider Notified: Yes  Provider Name: Dr. Junior Lenz Signs:   Vitals:    11/29/23 0410   BP: 108/58   Pulse: 89   Resp:    Temp:    SpO2:        Notes/comments:   SVE as above. Category I tracing. Pitocin at 10 and shelli every 1-3 minutes. Continue pitocin titration.  D/w Dr. Ivonne Dill MD 11/29/2023 4:21 AM

## 2023-11-29 NOTE — ANESTHESIA PROCEDURE NOTES
Epidural Block    Patient location during procedure: OB/L&D  Start time: 11/28/2023 11:03 PM  Reason for block: procedure for pain  Staffing  Performed by: Gaudencio Gordon CRNA  Authorized by: Jerry Shen MD    Preanesthetic Checklist  Completed: patient identified, IV checked, site marked, risks and benefits discussed, surgical consent, monitors and equipment checked, pre-op evaluation and timeout performed  Epidural  Patient position: sitting  Prep: ChloraPrep  Sedation Level: no sedation  Patient monitoring: frequent blood pressure checks, continuous pulse oximetry and heart rate  Approach: midline  Location: lumbar, L2-3  Injection technique: LISA saline  Needle  Needle type: Tuohy   Needle gauge: 17 G  Needle insertion depth: 5 cm  Catheter type: multi-orifice  Catheter size: 20 G  Catheter at skin depth: 10 cm  Catheter securement method: stabilization device and clear occlusive dressing  Test dose: negativelidocaine-epinephrine (XYLOCAINE-MPF/EPINEPHRINE) 1.5 %-1:200,000 injection 3 mL - Epidural   3 mL - 11/28/2023 11:05:00 PM  Assessment  Sensory level: T10  Number of attempts: 1negative aspiration for CSF, negative aspiration for heme and no paresthesia on injection  patient tolerated the procedure well with no immediate complications

## 2023-11-29 NOTE — OB LABOR/OXYTOCIN SAFETY PROGRESS
Labor Progress Note - Janell Coyne 25 y.o. female MRN: [id number]    Unit/Bed#: -01 Encounter: [id number]    Dose (karsten-units/min) Oxytocin: 0 karsten-units/min (per Dr. Marcos Stringer)  Contraction Frequency (minutes): irritability, x2 ctx noted  Contraction Quality: Mild  Tachysystole: No   Cervical Dilation: 7        Cervical Effacement: 80  Fetal Station: -1  Baseline Rate: 130 bpm  Fetal Heart Rate: 142 BPM  FHR Category: 1               Vital Signs:   Vitals:    11/28/23 2130   BP: 126/90   Pulse:    Resp:    Temp:    SpO2:        Notes/comments:   SVE as above. SROM for clear fluid at 2053. Pitocin turned off due to tachysystole. Category I tracing. Patient uncomfortable and requesting IV morphine. Will reach out to Dr. Nallely Lebron.       Beau Hathaway MD 11/28/2023 9:56 PM

## 2023-11-29 NOTE — OB LABOR/OXYTOCIN SAFETY PROGRESS
Oxytocin Safety Progress Check Note - Dixie Cheema 25 y.o. female MRN: [id number]    Unit/Bed#: -01 Encounter: [id number]    Dose (karsten-units/min) Oxytocin: 16 karsten-units/min  Contraction Frequency (minutes): 1.5-4.5  Contraction Quality: Strong  Tachysystole: No   Cervical Dilation: 9        Cervical Effacement: 90  Fetal Station: 0  Baseline Rate: 140 bpm  Fetal Heart Rate: 160 BPM  FHR Category: cat II, intermittent variables, moderate variability and accelerations     Provider Notified: Yes  Provider Name: Dr. Maylin Beltre Signs:   Vitals:    11/29/23 0745   BP: 105/65   Pulse:    Resp:    Temp:    SpO2:        Notes/comments:   Change maternal position  C/w pitocin  Fetal monitoring per protocol      Tato Roca MD 11/29/2023 8:26 AM

## 2023-11-29 NOTE — OB LABOR/OXYTOCIN SAFETY PROGRESS
Oxytocin Safety Progress Check Note - Lindy Cisneros 25 y.o. female MRN: [id number]    Unit/Bed#: -01 Encounter: [id number]    Dose (karsten-units/min) Oxytocin: 12 karsten-units/min  Contraction Frequency (minutes): 1-3  Contraction Quality: Moderate, Strong  Tachysystole: No   Cervical Dilation: 7-8        Cervical Effacement: 80  Fetal Station: 0  Baseline Rate: 135 bpm  Fetal Heart Rate: 155 BPM  FHR Category: 2     Provider Notified: Yes  Provider Name: Dr. Ajit Smith      Vital Signs:   Vitals:    11/29/23 0454   BP: 106/58   Pulse: 94   Resp:    Temp:    SpO2:        Notes/comments:   Category II tracing due to intermittent variable decelerations. Improved with turning patient to left side. IUPC placed and contractions currently not adequate. Will increase pitocin soon if maintain category I strip.       Sarkis Small MD 11/29/2023 5:16 AM

## 2023-11-29 NOTE — OB LABOR/OXYTOCIN SAFETY PROGRESS
Oxytocin Safety Progress Check Note - Harper Jose 25 y.o. female MRN: [id number]    Unit/Bed#: -01 Encounter: [id number]    Dose (karsten-units/min) Oxytocin: 16 karsten-units/min  Contraction Frequency (minutes): 1.5-3.5  Contraction Quality: Moderate, Strong  Tachysystole: No   Cervical Dilation: 9        Cervical Effacement: 90  Fetal Station: 1  Baseline Rate: 145 bpm  Fetal Heart Rate: 145 BPM  FHR Category: 1     Provider Notified: Yes  Provider Name: Dr. José Luis Mcallister      Vital Signs:   Vitals:    11/29/23 0609   BP: 110/63   Pulse: 72   Resp: 18   Temp: 99.3 °F (37.4 °C)   SpO2:        Notes/comments:   9cm, OP. Occasional variables, overall reassuring. Continue pitocin/position changes.       [address] 72/21/0465 0:81 AM

## 2023-11-29 NOTE — OB LABOR/OXYTOCIN SAFETY PROGRESS
Oxytocin Safety Progress Check Note - Waldo Bardales 25 y.o. female MRN: [id number]    Unit/Bed#: LD  Encounter: [id number]    Dose (karsten-units/min) Oxytocin: 16 karsten-units/min  Contraction Frequency (minutes): 2-2.5  Contraction Quality: Strong  Tachysystole: No   Cervical Dilation: 9        Cervical Effacement: 90  Fetal Station: 0  Baseline Rate: 150 bpm  Fetal Heart Rate: 150 BPM  FHR Category: cat II, recurrent variable decelerations, moderate variability     Provider Notified: Yes  Provider Name: Dr. Grace Foss      Vital Signs:   Vitals:    23 0945   BP: 113/57   Pulse:    Resp:    Temp:    SpO2:        Notes/comments:   Cervix is edematous at 9cm. She has been 9 cm x 3 hours. Baby OP. Amnioinfusion bolus was started for variable decelerations. Recommend  section for arrest disorder, suspected CPD, and non-reassuring fetal tracing. Pt. And partner are discussing options.      Angie Sterling MD 2023 9:54 AM

## 2023-11-29 NOTE — PLAN OF CARE
Problem: BIRTH - VAGINAL/ SECTION  Goal: Fetal and maternal status remain reassuring during the birth process  Description: INTERVENTIONS:  - Monitor vital signs  - Monitor fetal heart rate  - Monitor uterine activity  - Monitor labor progression (vaginal delivery)  - DVT prophylaxis  - Antibiotic prophylaxis  2023 by Yeny Oglesby RN  Outcome: Completed  2023 by Yeny Oglesby RN  Outcome: Progressing  Goal: Emotionally satisfying birthing experience for mother/fetus  Description: Interventions:  - Assess, plan, implement and evaluate the nursing care given to the patient in labor  - Advocate the philosophy that each childbirth experience is a unique experience and support the family's chosen level of involvement and control during the labor process   - Actively participate in both the patient's and family's teaching of the birth process  - Consider cultural, Advent and age-specific factors and plan care for the patient in labor  2023 by Yeny Oglesby RN  Outcome: Completed  2023 by Yves Toussaint RN  Outcome: Progressing     Problem: Knowledge Deficit  Goal: Verbalizes understanding of labor plan  Description: Assess patient/family/caregiver's baseline knowledge level and ability to understand information. Provide education via patient/family/caregiver's preferred learning method at appropriate level of understanding. 1. Provide teaching at level of understanding. 2. Provide teaching via preferred learning method(s). 2023 by Yeny Oglesby RN  Outcome: Completed  2023 by Yves Toussaint RN  Outcome: Progressing  Goal: Patient/family/caregiver demonstrates understanding of disease process, treatment plan, medications, and discharge instructions  Description: Complete learning assessment and assess knowledge base.   Interventions:  - Provide teaching at level of understanding  - Provide teaching via preferred learning methods  11/29/2023 1149 by Guerda Velazquez RN  Outcome: Completed  11/29/2023 0721 by Cody Toussaint RN  Outcome: Progressing     Problem: Labor & Delivery  Goal: Manages discomfort  Description: Assess and monitor for signs and symptoms of discomfort. Assess patient's pain level regularly and per hospital policy. Administer medications as ordered. Support use of nonpharmacological methods to help control pain such as distraction, imagery, relaxation, and application of heat and cold. Collaborate with interdisciplinary team and patient to determine appropriate pain management plan. 1. Include patient in decisions related to comfort. 2. Offer non-pharmacological pain management interventions. 3. Report ineffective pain management to physician. 11/29/2023 1149 by Guerda Velazquez RN  Outcome: Completed  11/29/2023 0721 by Cody Toussaint RN  Outcome: Progressing  Goal: Patient vital signs are stable  Description: 1. Assess vital signs - vaginal delivery.   11/29/2023 1149 by Guerda Velazquez RN  Outcome: Completed  11/29/2023 0721 by Cody Toussaint RN  Outcome: Progressing

## 2023-11-29 NOTE — OB LABOR/OXYTOCIN SAFETY PROGRESS
Oxytocin Safety Progress Check Note - Yuliana Davenport 25 y.o. female MRN: [id number]    Unit/Bed#: -01 Encounter: [id number]    Dose (karsten-units/min) Oxytocin: 0 karsten-units/min (per Dr. Miles Hirsch)  Contraction Frequency (minutes): 1-2  Contraction Quality: Strong  Tachysystole: No   Cervical Dilation: 7-8        Cervical Effacement: 70  Fetal Station: -1  Baseline Rate: 130 bpm  Fetal Heart Rate: 145 BPM  FHR Category: 1     Provider Notified: Yes  Provider Name: Dr. Mauricio Guillen Signs:   Vitals:    11/28/23 2332   BP: 103/55   Pulse: 71   Resp: 18   Temp: 99.2 °F (37.3 °C)   SpO2:        Notes/comments:   Patient comfortable s/p epidural.   Will restart pitocin due to minimal cervical change. Monitor closely.        [address] [facility],[address], DO 25/87/1273 19:09 AM

## 2023-11-30 PROBLEM — I45.10 RBBB: Chronic | Status: ACTIVE | Noted: 2017-01-24

## 2023-11-30 PROBLEM — Z98.891 STATUS POST PRIMARY LOW TRANSVERSE CESAREAN SECTION: Status: ACTIVE | Noted: 2023-10-24

## 2023-11-30 LAB
ERYTHROCYTE [DISTWIDTH] IN BLOOD BY AUTOMATED COUNT: 13.6 % (ref 11.6–15.1)
HCT VFR BLD AUTO: 23.9 % (ref 34.8–46.1)
HGB BLD-MCNC: 7.5 G/DL (ref 11.5–15.4)
MCH RBC QN AUTO: 28.1 PG (ref 26.8–34.3)
MCHC RBC AUTO-ENTMCNC: 31.4 G/DL (ref 31.4–37.4)
MCV RBC AUTO: 90 FL (ref 82–98)
PLATELET # BLD AUTO: 215 THOUSANDS/UL (ref 149–390)
PMV BLD AUTO: 10.2 FL (ref 8.9–12.7)
RBC # BLD AUTO: 2.67 MILLION/UL (ref 3.81–5.12)
WBC # BLD AUTO: 24.01 THOUSAND/UL (ref 4.31–10.16)

## 2023-11-30 PROCEDURE — 99024 POSTOP FOLLOW-UP VISIT: CPT | Performed by: OBSTETRICS & GYNECOLOGY

## 2023-11-30 PROCEDURE — 85027 COMPLETE CBC AUTOMATED: CPT | Performed by: OBSTETRICS & GYNECOLOGY

## 2023-11-30 RX ORDER — IBUPROFEN 600 MG/1
600 TABLET ORAL EVERY 6 HOURS PRN
Status: DISCONTINUED | OUTPATIENT
Start: 2023-11-30 | End: 2023-12-01 | Stop reason: HOSPADM

## 2023-11-30 RX ADMIN — KETOROLAC TROMETHAMINE 30 MG: 30 INJECTION, SOLUTION INTRAMUSCULAR; INTRAVENOUS at 00:06

## 2023-11-30 RX ADMIN — SODIUM CHLORIDE, SODIUM LACTATE, POTASSIUM CHLORIDE, AND CALCIUM CHLORIDE 125 ML/HR: .6; .31; .03; .02 INJECTION, SOLUTION INTRAVENOUS at 03:04

## 2023-11-30 RX ADMIN — DOCUSATE SODIUM 100 MG: 100 CAPSULE, LIQUID FILLED ORAL at 08:22

## 2023-11-30 RX ADMIN — KETOROLAC TROMETHAMINE 30 MG: 30 INJECTION, SOLUTION INTRAMUSCULAR; INTRAVENOUS at 05:13

## 2023-11-30 RX ADMIN — ACETAMINOPHEN 650 MG: 325 TABLET, FILM COATED ORAL at 05:54

## 2023-11-30 RX ADMIN — ACETAMINOPHEN 650 MG: 325 TABLET, FILM COATED ORAL at 12:27

## 2023-11-30 RX ADMIN — ACETAMINOPHEN 650 MG: 325 TABLET, FILM COATED ORAL at 00:06

## 2023-11-30 RX ADMIN — ENOXAPARIN SODIUM 40 MG: 40 INJECTION SUBCUTANEOUS at 08:22

## 2023-11-30 RX ADMIN — CLINDAMYCIN PHOSPHATE 900 MG: 900 INJECTION, SOLUTION INTRAVENOUS at 03:04

## 2023-11-30 RX ADMIN — IBUPROFEN 600 MG: 600 TABLET, FILM COATED ORAL at 16:53

## 2023-11-30 RX ADMIN — IBUPROFEN 600 MG: 600 TABLET, FILM COATED ORAL at 23:42

## 2023-11-30 RX ADMIN — ACETAMINOPHEN 650 MG: 325 TABLET, FILM COATED ORAL at 21:33

## 2023-11-30 RX ADMIN — NALBUPHINE HYDROCHLORIDE 5 MG: 10 INJECTION, SOLUTION INTRAMUSCULAR; INTRAVENOUS; SUBCUTANEOUS at 03:12

## 2023-11-30 RX ADMIN — IRON SUCROSE 200 MG: 20 INJECTION, SOLUTION INTRAVENOUS at 03:28

## 2023-11-30 NOTE — PROGRESS NOTES
Progress Note - OB/GYN  Salvador Collins 25 y.o. female MRN: [id number]  Unit/Bed#:  313-01 Encounter: [id number]    Assessment and Plan     Salvador Collins is a patient of: [facility]. She is PPD# 1 s/p  primary  section, low transverse incision  Recovering well and is stable       Mild asthma  Assessment & Plan  Exercise induced  Albuterol PRN    RBBB  Assessment & Plan  Remote history  Normal echo     * Status post primary low transverse  section  Assessment & Plan  QBL 1257, Hgb 10.8 --> 7.5, s/p venofer  Lines: pierre removed, voiding spontaneously without issue  Pain: Tylenol and toradol scheduled, neeru 5/10 PRN    FEN: Tolerating regular diet  DVT ppx: SCDs and Lovenox 40mg qD  Passing flatus   Incision C/D/I           Disposition    - Anticipate discharge home on PPD# 2-3      Subjective/Objective     Chief Complaint: Postpartum State     Subjective:    Salvador Collins is POD#1 s/p  primary  section, low transverse incision. She has no current complaints. Pain is well controlled. Patient is currently voiding. She is ambulating. Patient is currently passing flatus and has had no bowel movement. She is tolerating PO, and denies nausea or vomitting. Patient denies fever, chills, chest pain, shortness of breath, or calf tenderness. Lochia is normal. She is  Breastfeeding. She is recovering well and is stable. Vitals:   BP (!) 89/51 (BP Location: Right arm) Comment: Rn notified  Pulse 85   Temp 97.5 °F (36.4 °C) (Oral)   Resp 20   Ht 5' 2.99" (1.6 m)   Wt 78.2 kg (172 lb 6.4 oz)   LMP 2023   SpO2 97%   Breastfeeding Yes   BMI 30.55 kg/m²       Intake/Output Summary (Last 24 hours) at 2023 0615  Last data filed at 2023 0511  Gross per 24 hour   Intake 2550 ml   Output 2507 ml   Net 43 ml       Invasive Devices       Peripheral Intravenous Line  Duration             Peripheral IV 23 Distal;Dorsal (posterior); Left Forearm 1 day Physical Exam:   GEN: Cecilia Eduardo appears well, alert and oriented x 3, pleasant and cooperative   CARDIO: RRR, no murmurs or rubs  RESP:  CTAB, no wheezes or rales  ABDOMEN: soft, no tenderness, no distention, fundus firm @ umbilicus slightly deviated to R. Patient has not peed recently as she has been breastfeeding. Incision C/D/I with scant blood dried at the center  EXTREMITIES: non tender, no erythema, b/l Pritesh's sign negative      Labs:     Hemoglobin   Date Value Ref Range Status   11/30/2023 7.5 (L) 11.5 - 15.4 g/dL Final     Comment:     Results verified by repeat   11/28/2023 10.8 (L) 11.5 - 15.4 g/dL Final     WBC   Date Value Ref Range Status   11/30/2023 24.01 (H) 4.31 - 10.16 Thousand/uL Final   11/28/2023 9.84 4.31 - 10.16 Thousand/uL Final     Platelets   Date Value Ref Range Status   11/30/2023 215 149 - 390 Thousands/uL Final   11/28/2023 226 149 - 390 Thousands/uL Final     Creatinine   Date Value Ref Range Status   02/05/2023 0.61 0.60 - 1.30 mg/dL Final     Comment:     Standardized to IDMS reference method     AST   Date Value Ref Range Status   02/05/2023 14 5 - 45 U/L Final     Comment:     Specimen collection should occur prior to Sulfasalazine administration due to the potential for falsely depressed results. ALT   Date Value Ref Range Status   02/05/2023 29 12 - 78 U/L Final     Comment:     Specimen collection should occur prior to Sulfasalazine and/or Sulfapyridine administration due to the potential for falsely depressed results.            Joey Pena MD  11/30/2023  6:15 AM

## 2023-11-30 NOTE — LACTATION NOTE
This note was copied from a baby's chart. Follow up: Baby is having circ. Procedure. Enc. S2S when baby comes back into the room. Enc. Hand expression prior to each latch. Ed. On nipple sensitivity the first few day. Confirmed no pain level with latch. Ed. On cluster feeding and second night syndrome. Enc. To call lactation to assist with latch. Mom is encouraged to place baby skin to skin for feedings. Skin to skin education provided for baby placement on mother's chest, baby only in diaper, blankets below shoulders on baby's back. Skin to skin is encouraged to continue at home for feedings and between feedings. Discussed 2nd night syndrome and ways to calm infant. Hand out given. Information on hand expression given. Discussed benefits of knowing how to manually express breast including stimulating milk supply, softening nipple for latch and evacuating breast in the event of engorgement. Encouraged parents to call for assistance, questions, and concerns about breastfeeding. Extension provided.

## 2023-11-30 NOTE — PLAN OF CARE
Problem: PAIN - ADULT  Goal: Verbalizes/displays adequate comfort level or baseline comfort level  Description: Interventions:  - Encourage patient to monitor pain and request assistance  - Assess pain using appropriate pain scale  - Administer analgesics based on type and severity of pain and evaluate response  - Implement non-pharmacological measures as appropriate and evaluate response  - Consider cultural and social influences on pain and pain management  - Notify physician/advanced practitioner if interventions unsuccessful or patient reports new pain  Outcome: Progressing     Problem: INFECTION - ADULT  Goal: Absence or prevention of progression during hospitalization  Description: INTERVENTIONS:  - Assess and monitor for signs and symptoms of infection  - Monitor lab/diagnostic results  - Monitor all insertion sites, i.e. indwelling lines, tubes, and drains  - Monitor endotracheal if appropriate and nasal secretions for changes in amount and color  - [place] appropriate cooling/warming therapies per order  - Administer medications as ordered  - Instruct and encourage patient and family to use good hand hygiene technique  - Identify and instruct in appropriate isolation precautions for identified infection/condition  Outcome: Progressing  Goal: Absence of fever/infection during neutropenic period  Description: INTERVENTIONS:  - Monitor WBC    Outcome: Progressing     Problem: SAFETY ADULT  Goal: Patient will remain free of falls  Description: INTERVENTIONS:  - Educate patient/family on patient safety including physical limitations  - Instruct patient to call for assistance with activity   - Consult OT/PT to assist with strengthening/mobility   - Keep Call bell within reach  - Keep bed low and locked with side rails adjusted as appropriate  - Keep care items and personal belongings within reach  - Initiate and maintain comfort rounds  - Make Fall Risk Sign visible to staff  - Apply yellow socks and bracelet for high fall risk patients  - Consider moving patient to room near nurses station  Outcome: Progressing  Goal: Maintain or return to baseline ADL function  Description: INTERVENTIONS:  -  Assess patient's ability to carry out ADLs; assess patient's baseline for ADL function and identify physical deficits which impact ability to perform ADLs (bathing, care of mouth/teeth, toileting, grooming, dressing, etc.)  - Assess/evaluate cause of self-care deficits   - Assess range of motion  - Assess patient's mobility; develop plan if impaired  - Assess patient's need for assistive devices and provide as appropriate  - Encourage maximum independence but intervene and supervise when necessary  - Involve family in performance of ADLs  - Assess for home care needs following discharge   - Consider OT consult to assist with ADL evaluation and planning for discharge  - Provide patient education as appropriate  Outcome: Progressing  Goal: Maintains/Returns to pre admission functional level  Description: INTERVENTIONS:  - Perform AM-PAC 6 Click Basic Mobility/ Daily Activity assessment daily.  - Set and communicate daily mobility goal to care team and patient/family/caregiver.    - Collaborate with rehabilitation services on mobility goals if consulted    - Out of bed for toileting  - Record patient progress and toleration of activity level   Outcome: Progressing     Problem: DISCHARGE PLANNING  Goal: Discharge to home or other facility with appropriate resources  Description: INTERVENTIONS:  - Identify barriers to discharge w/patient and caregiver  - Arrange for needed discharge resources and transportation as appropriate  - Identify discharge learning needs (meds, wound care, etc.)  - Arrange for interpretive services to assist at discharge as needed  - Refer to Case Management Department for coordinating discharge planning if the patient needs post-hospital services based on physician/advanced practitioner order or complex needs related to functional status, cognitive ability, or social support system  Outcome: Progressing

## 2023-11-30 NOTE — PLAN OF CARE
Problem: POSTPARTUM  Goal: Experiences normal postpartum course  Description: INTERVENTIONS:  - Monitor maternal vital signs  - Assess uterine involution and lochia  Outcome: Progressing  Goal: Appropriate maternal -  bonding  Description: INTERVENTIONS:  - Identify family support  - Assess for appropriate maternal/infant bonding   -Encourage maternal/infant bonding opportunities  - Referral to  or  as needed  Outcome: Progressing  Goal: Establishment of infant feeding pattern  Description: INTERVENTIONS:  - Assess breast/bottle feeding  - Refer to lactation as needed  Outcome: Progressing  Goal: Incision(s), wounds(s) or drain site(s) healing without S/S of infection  Description: INTERVENTIONS  - Assess and document dressing, incision, wound bed, drain sites and surrounding tissue  - Provide patient and family education  - Perform skin care/dressing changes every   Outcome: Progressing     Problem: PAIN - ADULT  Goal: Verbalizes/displays adequate comfort level or baseline comfort level  Description: Interventions:  - Encourage patient to monitor pain and request assistance  - Assess pain using appropriate pain scale  - Administer analgesics based on type and severity of pain and evaluate response  - Implement non-pharmacological measures as appropriate and evaluate response  - Consider cultural and social influences on pain and pain management  - Notify physician/advanced practitioner if interventions unsuccessful or patient reports new pain  Outcome: Progressing     Problem: INFECTION - ADULT  Goal: Absence or prevention of progression during hospitalization  Description: INTERVENTIONS:  - Assess and monitor for signs and symptoms of infection  - Monitor lab/diagnostic results  - Monitor all insertion sites, i.e. indwelling lines, tubes, and drains  - Monitor endotracheal if appropriate and nasal secretions for changes in amount and color  - [place] appropriate cooling/warming therapies per order  - Administer medications as ordered  - Instruct and encourage patient and family to use good hand hygiene technique  - Identify and instruct in appropriate isolation precautions for identified infection/condition  Outcome: Progressing  Goal: Absence of fever/infection during neutropenic period  Description: INTERVENTIONS:  - Monitor WBC    Outcome: Progressing     Problem: SAFETY ADULT  Goal: Patient will remain free of falls  Description: INTERVENTIONS:  - Educate patient/family on patient safety including physical limitations  - Instruct patient to call for assistance with activity   - Consult OT/PT to assist with strengthening/mobility   - Keep Call bell within reach  - Keep bed low and locked with side rails adjusted as appropriate  - Keep care items and personal belongings within reach  - Initiate and maintain comfort rounds  - Make Fall Risk Sign visible to staff  - Offer Toileting every  Hours, in advance of need  - Initiate/Maintain alarm  - Obtain necessary fall risk management equipment:   - Apply yellow socks and bracelet for high fall risk patients  - Consider moving patient to room near nurses station  Outcome: Progressing  Goal: Maintain or return to baseline ADL function  Description: INTERVENTIONS:  -  Assess patient's ability to carry out ADLs; assess patient's baseline for ADL function and identify physical deficits which impact ability to perform ADLs (bathing, care of mouth/teeth, toileting, grooming, dressing, etc.)  - Assess/evaluate cause of self-care deficits   - Assess range of motion  - Assess patient's mobility; develop plan if impaired  - Assess patient's need for assistive devices and provide as appropriate  - Encourage maximum independence but intervene and supervise when necessary  - Involve family in performance of ADLs  - Assess for home care needs following discharge   - Consider OT consult to assist with ADL evaluation and planning for discharge  - Provide patient education as appropriate  Outcome: Progressing  Goal: Maintains/Returns to pre admission functional level  Description: INTERVENTIONS:  - Perform AM-PAC 6 Click Basic Mobility/ Daily Activity assessment daily.  - Set and communicate daily mobility goal to care team and patient/family/caregiver. - Collaborate with rehabilitation services on mobility goals if consulted  - Perform Range of Motion  times a day. - Reposition patient every  hours.   - Dangle patient  times a day  - Stand patient  times a day  - Ambulate patient  times a day  - Out of bed to chair  times a day   - Out of bed for meals  times a day  - Out of bed for toileting  - Record patient progress and toleration of activity level   Outcome: Progressing     Problem: DISCHARGE PLANNING  Goal: Discharge to home or other facility with appropriate resources  Description: INTERVENTIONS:  - Identify barriers to discharge w/patient and caregiver  - Arrange for needed discharge resources and transportation as appropriate  - Identify discharge learning needs (meds, wound care, etc.)  - Arrange for interpretive services to assist at discharge as needed  - Refer to Case Management Department for coordinating discharge planning if the patient needs post-hospital services based on physician/advanced practitioner order or complex needs related to functional status, cognitive ability, or social support system  Outcome: Progressing

## 2023-11-30 NOTE — LACTATION NOTE
This note was copied from a baby's chart. Follow Up Lactation: Mom called - baby is back from nursery and desires assistance with latching. Mom has baby in a cradle hold on the left breast. Shallow latch noted with cheeks not touching the breast, lips curled under and taut. Slurping sound noted. Ed. On positioning and alignment. Demonstration with teach back on cross cradle hold. Flanging of upper / lower lips to achieve a wide mouth and a deep latch. Pillows were used to assist in supporting mom's arms. Ed. On asymmetrical latch and mom having baby s2s and hugging the breast. Deep latch achieved with active, coordinated sucking. Enc. Mom to use breast compressions when baby tires and takes breaks. Ed. On NNS during cluster feeding. Demonstration and teach back of breaking the latch and repositioning. Moved mom and baby into a side lying on the left breast. Deep latch achieved with asymmetrical positioning. Encouragement and reassurance provided. Cluster feeding reviewed    Enc. To call lactation. Education on positioning and alignment. Mom is encouraged to:     - Bring baby up to the breast (use of pillows to elevate so baby's torso is against mom's breasts)   - Skin to skin for feedings with top hand exposed to show signs of satiation   - Chin deep into breast tissue (make baby look up to the nipple)   - nose aligned to the nipple   -Wait for wide gape, drag chin on the breast so nipple is aimed at the upper, back palate  - Cheek should be touching breast   - Deep, firm hold of baby with ear, shoulder, hip alignment    Provided demonstration, education and support of deep latch to breast by placing the nipple to the nose, dragging down to chin to achieve a wide latch. Bring baby to the breast, not breast to baby. Move your shoulders down and away from your ears. Look for ear, shoulder, hip alignment.  Baby's upper and lower lip should be flanged on the breast.    Assistance with flanging baby's lips on the breast. Demonstration and teach-back of tugging on chin to flange lower lip and pressing digit into the breast, rolling digit under the upper lip and away from the oral cavity to flange upper lip while baby is latched. Education on how to achieve flanged lips with initial latch and positioning to the breast. Encouraged to un-latch and re-latch baby if mom has pain, discomfort, or if latch appears shallow. Encouraged to call lactation for assistance. Mom is encouraged to place baby skin to skin for feedings. Skin to skin education provided for baby placement on mother's chest, baby only in diaper, blankets below shoulders on baby's back. Skin to skin is encouraged to continue at home for feedings and between feedings. Education on creating a snug hold of your infant to the breast by verifying the infant's cheek is touching the breast, your infant's chin is deep into the breast tissue, your infant's arms are "hugging" the breast, and your infant's lips are flanged on the areola. Bring infant to the breast, not your breast to the infant. Latch should feel like a tugging sensation on the nipple. Demonstrated with teach back breast compressions during a feeding to increase milk transfer and stimulate suckling after a breathing/muscle break. Education and information provided about non-nutritive suck, role of colostrum, and benefits of skin to skin.

## 2023-11-30 NOTE — ASSESSMENT & PLAN NOTE
QBL 1257, Hgb 10.8 --> 7.5, s/p venofer  Lines: pierre removed, voiding spontaneously without issue  Pain: Tylenol and toradol scheduled, neeru 5/10 PRN    FEN: Tolerating regular diet  DVT ppx: SCDs and Lovenox 40mg qD  Passing flatus   Incision C/D/I

## 2023-12-01 VITALS
SYSTOLIC BLOOD PRESSURE: 131 MMHG | RESPIRATION RATE: 18 BRPM | BODY MASS INDEX: 30.55 KG/M2 | HEIGHT: 63 IN | HEART RATE: 78 BPM | WEIGHT: 172.4 LBS | DIASTOLIC BLOOD PRESSURE: 68 MMHG | OXYGEN SATURATION: 98 % | TEMPERATURE: 98.3 F

## 2023-12-01 PROCEDURE — 99024 POSTOP FOLLOW-UP VISIT: CPT | Performed by: OBSTETRICS & GYNECOLOGY

## 2023-12-01 PROCEDURE — NC001 PR NO CHARGE: Performed by: OBSTETRICS & GYNECOLOGY

## 2023-12-01 RX ORDER — OXYCODONE HYDROCHLORIDE 5 MG/1
5 TABLET ORAL EVERY 4 HOURS PRN
Qty: 10 TABLET | Refills: 0 | Status: SHIPPED | OUTPATIENT
Start: 2023-12-01 | End: 2023-12-12

## 2023-12-01 RX ORDER — ACETAMINOPHEN 325 MG/1
650 TABLET ORAL EVERY 6 HOURS PRN
Qty: 30 TABLET | Refills: 0 | Status: SHIPPED | OUTPATIENT
Start: 2023-12-01

## 2023-12-01 RX ORDER — IBUPROFEN 600 MG/1
600 TABLET ORAL EVERY 6 HOURS PRN
Qty: 30 TABLET | Refills: 0 | Status: SHIPPED | OUTPATIENT
Start: 2023-12-01

## 2023-12-01 RX ADMIN — OXYCODONE HYDROCHLORIDE 10 MG: 10 TABLET ORAL at 10:34

## 2023-12-01 RX ADMIN — DOCUSATE SODIUM 100 MG: 100 CAPSULE, LIQUID FILLED ORAL at 09:13

## 2023-12-01 RX ADMIN — IBUPROFEN 600 MG: 600 TABLET, FILM COATED ORAL at 05:17

## 2023-12-01 RX ADMIN — IBUPROFEN 600 MG: 600 TABLET, FILM COATED ORAL at 13:03

## 2023-12-01 RX ADMIN — ENOXAPARIN SODIUM 40 MG: 40 INJECTION SUBCUTANEOUS at 10:21

## 2023-12-01 RX ADMIN — ACETAMINOPHEN 650 MG: 325 TABLET, FILM COATED ORAL at 09:13

## 2023-12-01 NOTE — PLAN OF CARE
Problem: POSTPARTUM  Goal: Experiences normal postpartum course  Description: INTERVENTIONS:  - Monitor maternal vital signs  - Assess uterine involution and lochia  Outcome: Progressing  Goal: Appropriate maternal -  bonding  Description: INTERVENTIONS:  - Identify family support  - Assess for appropriate maternal/infant bonding   -Encourage maternal/infant bonding opportunities  - Referral to  or  as needed  Outcome: Progressing  Goal: Establishment of infant feeding pattern  Description: INTERVENTIONS:  - Assess breast/bottle feeding  - Refer to lactation as needed  Outcome: Progressing  Goal: Incision(s), wounds(s) or drain site(s) healing without S/S of infection  Description: INTERVENTIONS  - Assess and document dressing, incision, wound bed, drain sites and surrounding tissue  - Provide patient and family education  - Perform skin care/dressing changes every   Outcome: Progressing     Problem: PAIN - ADULT  Goal: Verbalizes/displays adequate comfort level or baseline comfort level  Description: Interventions:  - Encourage patient to monitor pain and request assistance  - Assess pain using appropriate pain scale  - Administer analgesics based on type and severity of pain and evaluate response  - Implement non-pharmacological measures as appropriate and evaluate response  - Consider cultural and social influences on pain and pain management  - Notify physician/advanced practitioner if interventions unsuccessful or patient reports new pain  Outcome: Progressing     Problem: INFECTION - ADULT  Goal: Absence or prevention of progression during hospitalization  Description: INTERVENTIONS:  - Assess and monitor for signs and symptoms of infection  - Monitor lab/diagnostic results  - Monitor all insertion sites, i.e. indwelling lines, tubes, and drains  - Monitor endotracheal if appropriate and nasal secretions for changes in amount and color  - [place] appropriate cooling/warming therapies per order  - Administer medications as ordered  - Instruct and encourage patient and family to use good hand hygiene technique  - Identify and instruct in appropriate isolation precautions for identified infection/condition  Outcome: Progressing  Goal: Absence of fever/infection during neutropenic period  Description: INTERVENTIONS:  - Monitor WBC    Outcome: Progressing     Problem: SAFETY ADULT  Goal: Patient will remain free of falls  Description: INTERVENTIONS:  - Educate patient/family on patient safety including physical limitations  - Instruct patient to call for assistance with activity   - Consult OT/PT to assist with strengthening/mobility   - Keep Call bell within reach  - Keep bed low and locked with side rails adjusted as appropriate  - Keep care items and personal belongings within reach  - Initiate and maintain comfort rounds  - Make Fall Risk Sign visible to staff  - Offer Toileting every  Hours, in advance of need  - Initiate/Maintain alarm  - Obtain necessary fall risk management equipment:   - Apply yellow socks and bracelet for high fall risk patients  - Consider moving patient to room near nurses station  Outcome: Progressing  Goal: Maintain or return to baseline ADL function  Description: INTERVENTIONS:  -  Assess patient's ability to carry out ADLs; assess patient's baseline for ADL function and identify physical deficits which impact ability to perform ADLs (bathing, care of mouth/teeth, toileting, grooming, dressing, etc.)  - Assess/evaluate cause of self-care deficits   - Assess range of motion  - Assess patient's mobility; develop plan if impaired  - Assess patient's need for assistive devices and provide as appropriate  - Encourage maximum independence but intervene and supervise when necessary  - Involve family in performance of ADLs  - Assess for home care needs following discharge   - Consider OT consult to assist with ADL evaluation and planning for discharge  - Provide patient education as appropriate  Outcome: Progressing  Goal: Maintains/Returns to pre admission functional level  Description: INTERVENTIONS:  - Perform AM-PAC 6 Click Basic Mobility/ Daily Activity assessment daily.  - Set and communicate daily mobility goal to care team and patient/family/caregiver. - Collaborate with rehabilitation services on mobility goals if consulted  - Perform Range of Motion  times a day. - Reposition patient every  hours.   - Dangle patient  times a day  - Stand patient  times a day  - Ambulate patient  times a day  - Out of bed to chair  times a day   - Out of bed for meals  times a day  - Out of bed for toileting  - Record patient progress and toleration of activity level   Outcome: Progressing     Problem: DISCHARGE PLANNING  Goal: Discharge to home or other facility with appropriate resources  Description: INTERVENTIONS:  - Identify barriers to discharge w/patient and caregiver  - Arrange for needed discharge resources and transportation as appropriate  - Identify discharge learning needs (meds, wound care, etc.)  - Arrange for interpretive services to assist at discharge as needed  - Refer to Case Management Department for coordinating discharge planning if the patient needs post-hospital services based on physician/advanced practitioner order or complex needs related to functional status, cognitive ability, or social support system  Outcome: Progressing

## 2023-12-01 NOTE — PROGRESS NOTES
Progress Note - OB/GYN  Dixie Cheema 25 y.o. female MRN: [id number]  Unit/Bed#: -01 Encounter: [id number]    Assessment and Plan     Dixie Cheema is a patient of: [address] [place] [address]. She is PPD# 2 s/p  primary  section, low transverse incision  Recovering well and is stable       Mild asthma  Assessment & Plan  Exercise induced  Albuterol PRN    RBBB  Assessment & Plan  Remote history  Normal echo     * Status post primary low transverse  section  Assessment & Plan  QBL 1257, Hgb 10.8 --> 7.5, s/p venofer  Lines: pierre removed, voiding spontaneously without issue  Pain: Tylenol and toradol scheduled, neeru 5/10 PRN    FEN: Tolerating regular diet  DVT ppx: SCDs and Lovenox 40mg qD  Passing flatus   Incision C/D/I           Disposition    - Anticipate discharge home on PPD# 2      Subjective/Objective     Chief Complaint: Postpartum State     Subjective:    Dixie Cheema is PPD/POD#2 s/p  primary  section, low transverse incision. She has no current complaints. Pain is well controlled. Patient is currently voiding. She is ambulating. Patient is currently passing flatus and has had no bowel movement. She is tolerating PO, and denies nausea or vomitting. Patient denies fever, chills, chest pain, shortness of breath, or calf tenderness. Lochia is normal. She is  Breastfeeding. She is recovering well and is stable.        Vitals:   BP (!) 101/47 (BP Location: Right arm) Comment: RN notified  Pulse 82   Temp 98.2 °F (36.8 °C) (Oral)   Resp 20   Ht 5' 2.99" (1.6 m)   Wt 78.2 kg (172 lb 6.4 oz)   LMP 2023   SpO2 98%   Breastfeeding Yes   BMI 30.55 kg/m²       Intake/Output Summary (Last 24 hours) at 2023 0556  Last data filed at 2023 0815  Gross per 24 hour   Intake --   Output 400 ml   Net -400 ml       Invasive Devices       None                   Physical Exam:   GEN: Dixie Cheema appears well, alert and oriented x 3, pleasant and cooperative CARDIO: RRR  RESP:  CTA   ABDOMEN: soft, no tenderness, no distention, fundus firm @ U, Incision C/D/I  EXTREMITIES: non tender, no erythema, b/l Pritesh's sign negative      Labs:     Hemoglobin   Date Value Ref Range Status   11/30/2023 7.5 (L) 11.5 - 15.4 g/dL Final     Comment:     Results verified by repeat   11/28/2023 10.8 (L) 11.5 - 15.4 g/dL Final     WBC   Date Value Ref Range Status   11/30/2023 24.01 (H) 4.31 - 10.16 Thousand/uL Final   11/28/2023 9.84 4.31 - 10.16 Thousand/uL Final     Platelets   Date Value Ref Range Status   11/30/2023 215 149 - 390 Thousands/uL Final   11/28/2023 226 149 - 390 Thousands/uL Final     Creatinine   Date Value Ref Range Status   02/05/2023 0.61 0.60 - 1.30 mg/dL Final     Comment:     Standardized to IDMS reference method     AST   Date Value Ref Range Status   02/05/2023 14 5 - 45 U/L Final     Comment:     Specimen collection should occur prior to Sulfasalazine administration due to the potential for falsely depressed results. ALT   Date Value Ref Range Status   02/05/2023 29 12 - 78 U/L Final     Comment:     Specimen collection should occur prior to Sulfasalazine and/or Sulfapyridine administration due to the potential for falsely depressed results.            Jamaal Olea MD  12/1/2023  5:56 AM

## 2023-12-01 NOTE — LACTATION NOTE
This note was copied from a baby's chart. Mom c/o sore nipple on the left during the first 1-2 minutes of the feeding and struggling more on that side, encouraged her to work on latch and positioning, begin tummy time at home and follow up with baby and me if not resolving in the next few days. Worked on positioning infant up at chest level and starting to feed infant with nose arriving at the nipple. Then, using areolar compression to achieve a deep latch that is comfortable and exchanges optimum amounts of milk. I offered suggestions on positioning, for a more optimal latch, showed mom proper positioning, ear, shoulder hip in line, baby's arms open, not in between mom and baby, nose to nipple, hand at base of head/neck. Baby latches easily to the breast, after a few minutes latch becomes shallow and dimpling of the cheeks is noted, encouraged mom to un-latch baby and latch again, with re-latch mom reports a more comfortable feeding and dimpling is resolved. Encouraged breast compressions as needed throughout the feeding. Met with mother to review the Discharge Breastfeeding book, including use of the the feeding log, expected number of feedings and output; breastfeeding and your lifestyle( medications, caffeine, drugs, alcohol use); how to recognize and and remedy at home and when to call the doctor for: breast engorgement, block milked ducts and mastitis; storage and preparation of breast milk; cleaning of bottles and pump parts; paced bottle feeding and how to contact the Baby and [address] as needed. Encouraged parents to call for assistance as needed.

## 2023-12-01 NOTE — PLAN OF CARE
Problem: PAIN - ADULT  Goal: Verbalizes/displays adequate comfort level or baseline comfort level  Description: Interventions:  - Encourage patient to monitor pain and request assistance  - Assess pain using appropriate pain scale  - Administer analgesics based on type and severity of pain and evaluate response  - Implement non-pharmacological measures as appropriate and evaluate response  - Consider cultural and social influences on pain and pain management  - Notify physician/advanced practitioner if interventions unsuccessful or patient reports new pain  Outcome: Progressing     Problem: INFECTION - ADULT  Goal: Absence or prevention of progression during hospitalization  Description: INTERVENTIONS:  - Assess and monitor for signs and symptoms of infection  - Monitor lab/diagnostic results  - Monitor all insertion sites, i.e. indwelling lines, tubes, and drains  - Monitor endotracheal if appropriate and nasal secretions for changes in amount and color  - [place] appropriate cooling/warming therapies per order  - Administer medications as ordered  - Instruct and encourage patient and family to use good hand hygiene technique  - Identify and instruct in appropriate isolation precautions for identified infection/condition  Outcome: Progressing  Goal: Absence of fever/infection during neutropenic period  Description: INTERVENTIONS:  - Monitor WBC    Outcome: Progressing     Problem: SAFETY ADULT  Goal: Patient will remain free of falls  Description: INTERVENTIONS:  - Educate patient/family on patient safety including physical limitations  - Instruct patient to call for assistance with activity   - Consult OT/PT to assist with strengthening/mobility   - Keep Call bell within reach  - Keep bed low and locked with side rails adjusted as appropriate  - Keep care items and personal belongings within reach  - Initiate and maintain comfort rounds  - Make Fall Risk Sign visible to staff  - Offer Toileting every  Hours, in advance of need  - Initiate/Maintain alarm  - Obtain necessary fall risk management equipment:   - Apply yellow socks and bracelet for high fall risk patients  - Consider moving patient to room near nurses station  Outcome: Progressing  Goal: Maintain or return to baseline ADL function  Description: INTERVENTIONS:  -  Assess patient's ability to carry out ADLs; assess patient's baseline for ADL function and identify physical deficits which impact ability to perform ADLs (bathing, care of mouth/teeth, toileting, grooming, dressing, etc.)  - Assess/evaluate cause of self-care deficits   - Assess range of motion  - Assess patient's mobility; develop plan if impaired  - Assess patient's need for assistive devices and provide as appropriate  - Encourage maximum independence but intervene and supervise when necessary  - Involve family in performance of ADLs  - Assess for home care needs following discharge   - Consider OT consult to assist with ADL evaluation and planning for discharge  - Provide patient education as appropriate  Outcome: Progressing  Goal: Maintains/Returns to pre admission functional level  Description: INTERVENTIONS:  - Perform AM-PAC 6 Click Basic Mobility/ Daily Activity assessment daily.  - Set and communicate daily mobility goal to care team and patient/family/caregiver. - Collaborate with rehabilitation services on mobility goals if consulted  - Perform Range of Motion  times a day. - Reposition patient every  hours.   - Dangle patient  times a day  - Stand patient  times a day  - Ambulate patient  times a day  - Out of bed to chair  times a day   - Out of bed for meals  times a day  - Out of bed for toileting  - Record patient progress and toleration of activity level   Outcome: Progressing     Problem: DISCHARGE PLANNING  Goal: Discharge to home or other facility with appropriate resources  Description: INTERVENTIONS:  - Identify barriers to discharge w/patient and caregiver  - Arrange for needed discharge resources and transportation as appropriate  - Identify discharge learning needs (meds, wound care, etc.)  - Arrange for interpretive services to assist at discharge as needed  - Refer to Case Management Department for coordinating discharge planning if the patient needs post-hospital services based on physician/advanced practitioner order or complex needs related to functional status, cognitive ability, or social support system  Outcome: Progressing     Problem: POSTPARTUM  Goal: Experiences normal postpartum course  Description: INTERVENTIONS:  - Monitor maternal vital signs  - Assess uterine involution and lochia  Outcome: Progressing  Goal: Appropriate maternal -  bonding  Description: INTERVENTIONS:  - Identify family support  - Assess for appropriate maternal/infant bonding   -Encourage maternal/infant bonding opportunities  - Referral to  or  as needed  Outcome: Progressing  Goal: Establishment of infant feeding pattern  Description: INTERVENTIONS:  - Assess breast/bottle feeding  - Refer to lactation as needed  Outcome: Progressing  Goal: Incision(s), wounds(s) or drain site(s) healing without S/S of infection  Description: INTERVENTIONS  - Assess and document dressing, incision, wound bed, drain sites and surrounding tissue  - Provide patient and family education  - Perform skin care/dressing changes every   Outcome: Progressing

## 2023-12-03 NOTE — DISCHARGE SUMMARY
OB Discharge Summary        Date of Admission : 2023    Date of Discharge : 2028    Attending Physician : George Ro    Consulting Physician :Anesthesia    Admitting Diagnosis : 40wk Elective IOL    Discharge Diagnosis : 40 wk elective IOL, 1LTCS for arrest of dilation - 9cm - occiput posterior    Brief History : 25 y.o. Kya Patterson admitted for     Procedure : Primary Low Transverse     Post Operative Course : Post operatively, her course was uneventful. Her post-op H and H was 7.5/23.9. She was discharged on POD # 3. She was ambulating, voiding, tolerating a regular diet without difficulty. *Her incision was free of signs of infection. She was given standard postoperative instructions and asked to f/u in the office in 1 week for an incision check. For complete list of medications please refer to her med rec.

## 2023-12-04 ENCOUNTER — TELEPHONE (OUTPATIENT)
Dept: OBGYN CLINIC | Facility: CLINIC | Age: 28
End: 2023-12-04

## 2023-12-04 NOTE — TELEPHONE ENCOUNTER
Spoke with patient- said rash is red and itchy. Recommended to take Benadryl, use benadryl ointment, hydrocortisone, Claritin, Zyrtec, keep area clean and dry, use gentle laundry detergent. Patient is agreeable. Will keep us posted if it does not get better or it worsens.

## 2023-12-04 NOTE — TELEPHONE ENCOUNTER
Adam spoke with patient earlier today re: scheduling appointments. The patient asked Adam what she could do for a rash that is on her stomach. Adam let the patient know that a nurse will be contacting her today to review. Left a voicemail for the patient to call us back.

## 2023-12-04 NOTE — UTILIZATION REVIEW
NOTIFICATION OF INPATIENT ADMISSION   MATERNITY/DELIVERY AUTHORIZATION REQUEST   SERVICING FACILITY:   [address] [place] [address] - L&D, , NICU  [facility]. [facility], [address] [place] [address]  Tax ID: [id number]  NPI: [id number]   ATTENDING PROVIDER:  Attending Name and NPI#: Karina Awan Md [[id number]]  Address: [address] [place] [address], [address] [place] [address]  Phone: [phone]   ADMISSION INFORMATION:  Place of Service: Inpatient [address] [facility]  Place of Service Code: 21  Inpatient Admission Date/Time: 23  2:44 PM  Discharge Date/Time: 2023  1:25 PM  Admitting Diagnosis Code/Description:  Encounter for induction of labor [Z34.90]  Encounter for  delivery without indication [O82]     Mother: Sudarshan England 1995 Estimated Date of Delivery: 23  Delivering clinician:     OB History          1    Para   1    Term   1       0    AB   0    Living   1         SAB   0    IAB   0    Ectopic   0    Multiple   0    Live Births   1           Obstetric Comments   Menarche 6              Name & MRN:   Information for the patient's :  Piero William [[id number]]   [place] Delivery Information:  Sex: male  Delivered 2023 10:51 AM by , Low Transverse; Gestational Age: 45w2d    [place] Measurements:  Weight: 7 lb 13 oz (3544 g); Height: 19.5"    APGAR 1 minute 5 minutes 10 minutes   Totals: 9 9      [place] Birth Information: 25 y.o. female MRN: [id number] Unit/Bed#: LD OVR   Birthweight: No birth weight on file. Gestational Age: <None> Delivery Type:    APGARS Totals:        UTILIZATION REVIEW CONTACT:  [place] Utilization   Network Utilization Review Department  Phone: [phone]  Fax [phone]  Email: [email or web address]  Contact for approvals/pending authorizations, clinical reviews, and discharge.      PHYSICIAN ADVISORY SERVICES:  Medical Necessity Denial & Cmjm-xe-Lfnc Review  Phone: [phone]  Fax: [phone]  Email: [email or web address]     DISCHARGE SUPPORT TEAM:  For Patients Discharge Needs & Updates  Phone: [phone] opt. 2 Fax: [phone]  Email: [email or web address]

## 2023-12-05 LAB — PLACENTA IN STORAGE: NORMAL

## 2023-12-12 ENCOUNTER — POSTPARTUM VISIT (OUTPATIENT)
Dept: OBGYN CLINIC | Facility: CLINIC | Age: 28
End: 2023-12-12

## 2023-12-12 VITALS
WEIGHT: 150.2 LBS | SYSTOLIC BLOOD PRESSURE: 102 MMHG | HEIGHT: 63 IN | DIASTOLIC BLOOD PRESSURE: 70 MMHG | BODY MASS INDEX: 26.61 KG/M2

## 2023-12-12 PROCEDURE — 99024 POSTOP FOLLOW-UP VISIT: CPT | Performed by: PHYSICIAN ASSISTANT

## 2023-12-12 NOTE — PROGRESS NOTES
Assessment/Plan:  Keep incision clean and dry  Continue current restrictions. Do not lift >10lbs  Do not submerge in water   Limit exercise to walking  Continue to monitor emotions. Patient to call for concerns  RTO in 4 weeks for routine 6 week postpartum visit     Diagnoses and all orders for this visit:    Postpartum care following  delivery          Subjective:      Patient ID: Anyi Daniels is a 25 y.o. female. Ganga Garrido is a 29YO  WF presenting to the office for routine postpartum care 13 days s/p 1LTCS for arrest of dilation and NRFHT. She is doing well today. Patient reports that she is breastfeeding which is going well. Her bleeding is intermittent, but manageable and has not been heavy. She reports normal appetite and normal bowel and bladder function. She feels well emotionally and has a good support system. She reports her incision is alittle tender, but denies any oozing or drainage from it. She is doing well overall. The following portions of the patient's history were reviewed and updated as appropriate: allergies, current medications, past family history, past medical history, past social history, past surgical history, and problem list.    OB History          1    Para   1    Term   1       0    AB   0    Living   1         SAB   0    IAB   0    Ectopic   0    Multiple   0    Live Births   1           Obstetric Comments   Menarche 8                 Review of Systems   Constitutional:  Negative for fever. Respiratory:  Negative for shortness of breath. Cardiovascular:  Negative for chest pain. Gastrointestinal:  Negative for abdominal pain, constipation and diarrhea. Skin:  Negative for rash. Psychiatric/Behavioral:  Negative for dysphoric mood. The patient is not nervous/anxious.           Objective:      /70 (BP Location: Left arm, Patient Position: Sitting)   Ht 5' 2.99" (1.6 m)   Wt 68.1 kg (150 lb 3.2 oz)   LMP 2023 Breastfeeding Yes   BMI 26.62 kg/m²          Physical Exam  Vitals reviewed. Constitutional:       Appearance: Normal appearance. HENT:      Head: Normocephalic and atraumatic. Pulmonary:      Effort: Pulmonary effort is normal.   Abdominal:      General: Abdomen is flat. A surgical scar is present. There is no distension. Palpations: Abdomen is soft. Tenderness: There is no abdominal tenderness. Comments: Incision clean and dry. No warmth or surrounding erythema. No oozing, bleeding or drainage from incision. Skin:     General: Skin is warm and dry. Findings: No lesion or rash. Neurological:      General: No focal deficit present. Mental Status: She is alert. Psychiatric:         Mood and Affect: Mood normal.         Behavior: Behavior normal.         Thought Content:  Thought content normal.

## 2024-01-18 ENCOUNTER — POSTPARTUM VISIT (OUTPATIENT)
Dept: OBGYN CLINIC | Facility: CLINIC | Age: 29
End: 2024-01-18

## 2024-01-18 VITALS — DIASTOLIC BLOOD PRESSURE: 58 MMHG | WEIGHT: 145.2 LBS | SYSTOLIC BLOOD PRESSURE: 100 MMHG | BODY MASS INDEX: 25.73 KG/M2

## 2024-01-18 PROCEDURE — 99024 POSTOP FOLLOW-UP VISIT: CPT | Performed by: PHYSICIAN ASSISTANT

## 2024-01-18 NOTE — PROGRESS NOTES
Assessment/Plan     Normal postpartum exam.     1. Contraception: none  2. Annual exam due in July.   3. Lactation consult, [facility] information discussed.   4. Increase activity as tolerated, may resume all normal activity.  5. Anticipated return to work: 6 - 12 weeks post partum.  6. Reviewed future pregnancy and delivery. Recommend at minimum 18 months from delivery to delivery if pt wants to elect for a TOLAC. Otherwise if short interval would recommend RLTCS due to increased risk for uterine rupture with short interval pregnancy.         Subjective   Chief Complaint   Patient presents with    Postpartum Care     Marii Bond is 6wks PP, LTCS on 2023, baby boy Dinesh.  Pt feeling well, hoping to conceive rather quickly.       Marii Bond is a 28 y.o.  female who presents for a postpartum visit.     Delivery Method: , Low Transverse    Delivery Date and Time: 2023  10:51 AM   Delivery Attending:    Gestational Age at delivery: 40w1d   Route of Delivery: , Low Transverse   Reason for  delivery: Category II FHR Tracing;Arrest of Dilation       Admitting Diagnoses:   Patient Active Problem List   Diagnosis    ADHD (attention deficit hyperactivity disorder), combined type    Anxiety disorder    Eczema    Migraines    RBBB    Encounter for supervision of normal first pregnancy in third trimester    Status post primary low transverse  section    Mild asthma    Arrest of dilation, delivered, current hospitalization    Non-reassuring electronic fetal monitoring tracing       Gestational Diabetes: no  Pregnancy Complications:  arrest of descent, 1LTCS    Anesthesia:  ,     Delivery: , Low Transverse  at 2023  10:51 AM       Baby's Name: Dinesh Enrique  Baby's Weight: 3544 g (7 lb 13 oz) ; 125     Apgar scores: 9  and 9  at 1 and 5 minutes, respectively  Breast or formula: Breastfeeding & pumping  Pediatrician: [facility]    Bleeding no bleeding. Bowel function: normal. Bladder function: normal. The patient is not having any pain.     Patient has not been sexually active. Desired contraception method is coitus interruptus.     Postpartum depression screening: negative. EPDS : 0. She denies depression/anxiety/trouble sleeping.     Last Pap : 2022 ; no abnormalities      The following portions of the patient's history were reviewed and updated as appropriate: allergies, current medications, past family history, past medical history, past social history, past surgical history, and problem list.      Current Outpatient Medications:     acetaminophen (TYLENOL) 325 mg tablet, Take 2 tablets (650 mg total) by mouth every 6 (six) hours as needed for mild pain, fever, moderate pain or headaches for up to 30 doses, Disp: 30 tablet, Rfl: 0    albuterol (ProAir HFA) 90 mcg/act inhaler, Inhale 2 puffs every 6 (six) hours as needed for wheezing, Disp: 8.5 g, Rfl: 1    ibuprofen (MOTRIN) 600 mg tablet, Take 1 tablet (600 mg total) by mouth every 6 (six) hours as needed for mild pain, moderate pain or headaches, Disp: 30 tablet, Rfl: 0    MAGNESIUM PO, Take by mouth, Disp: , Rfl:     Prenat-Fe Carbonyl-FA-Omega 3 (ONE-A-DAY WOMENS PRENATAL 1 PO), Take by mouth, Disp: , Rfl:     Allergies   Allergen Reactions    Grapefruit Extract - Food Allergy Cough     Throat turns red     Latex Rash and Cough    Ceclor [Cefaclor] Hives     Other reaction(s): hives    Vinyl Ether Swelling and Cough       Review of Systems  Review of Systems   Respiratory:  Negative for shortness of breath.    Cardiovascular:  Negative for chest pain.   Gastrointestinal:  Negative for abdominal pain, diarrhea, nausea and vomiting.   Skin:  Negative for rash.   Psychiatric/Behavioral:  Negative for dysphoric mood. The patient is not nervous/anxious.          Objective      /58 (BP Location: Left arm, Patient Position: Sitting, Cuff Size: Standard)   Wt 65.9 kg (145  lb 3.2 oz)   LMP 02/21/2023   Breastfeeding Yes   BMI 25.73 kg/m²     Physical Exam  Constitutional:       Appearance: Normal appearance.   HENT:      Head: Normocephalic and atraumatic.   Pulmonary:      Effort: Pulmonary effort is normal.   Abdominal:      General: Abdomen is flat. There is no distension.      Palpations: Abdomen is soft.      Tenderness: There is no abdominal tenderness.      Comments: Incision clean, dry, intact and well healed.   Neurological:      General: No focal deficit present.      Mental Status: She is alert.   Skin:     General: Skin is warm and dry.   Psychiatric:         Mood and Affect: Mood normal.         Behavior: Behavior normal.   Vitals reviewed.         Incision: clean, dry, and intact

## 2024-02-20 ENCOUNTER — TELEPHONE (OUTPATIENT)
Age: 29
End: 2024-02-20

## 2024-02-20 NOTE — TELEPHONE ENCOUNTER
Patient called and wants to return back to work today from maternity leave and asked if you can please update the letter in my chart and also please fax to [phone].  Thank you

## 2024-02-26 ENCOUNTER — AMB VIDEO VISIT (OUTPATIENT)
Dept: OTHER | Facility: HOSPITAL | Age: 29
End: 2024-02-26
Payer: COMMERCIAL

## 2024-02-26 VITALS — TEMPERATURE: 98.6 F

## 2024-02-26 PROCEDURE — 99212 OFFICE O/P EST SF 10 MIN: CPT | Performed by: NURSE PRACTITIONER

## 2024-02-26 NOTE — PROGRESS NOTES
Required Documentation:  Encounter provider KRZYSZTOF Bowser    Provider located at [facility]  VIRTUAL CARE   [address] [address]    Identify all parties in room with patient during virtual visit:  No one else    The patient was identified by name and date of birth. Marii Bond was informed that this is a telemedicine visit and that the visit is being conducted through the [employer] platform. She agrees to proceed..  My office door was closed. No one else was in the room.  She acknowledged consent and understanding of privacy and security of the video platform. The patient has agreed to participate and understands they can discontinue the visit at any time.    Verification of patient location:    Patient is located at Home in the following state in which I hold an active license [place]    Patient is aware this is a billable service.     Reason for visit is No chief complaint on file.       Subjective  This is a 28 year old female here today for video visit.  She states she started 3 days ago with with headache, mild body aches and congestion.  She is having post nasal drip.  She has tried flonase and tylenol.  She denies having a coughing.  She is eating and drinking normal.  She has been using OTC flonase.            Past Medical History:   Diagnosis Date    Abnormal Pap smear of cervix     Anxiety     Asthma     Eczema     Kidney stone     Migraine     Palpitations 2015    Tachycardia     Varicella        Past Surgical History:   Procedure Laterality Date    MT  DELIVERY ONLY N/A 2023    Procedure:  SECTION ();  Surgeon: Zully Arevalo MD;  Location: [facility] ;  Service: Obstetrics    WISDOM TOOTH EXTRACTION          Allergies   Allergen Reactions    Grapefruit Extract - Food Allergy Cough     Throat turns red     Latex Rash and Cough    Ceclor [Cefaclor] Hives     Other reaction(s): hives    Vinyl Ether  Swelling and Cough       Review of Systems   Constitutional:  Negative for activity change, chills, fatigue and fever.   HENT:  Positive for congestion, rhinorrhea, sinus pressure and sinus pain.    Respiratory:  Negative for cough and choking.    Cardiovascular: Negative.    Gastrointestinal: Negative.    Neurological: Negative.    Psychiatric/Behavioral: Negative.         Video Exam    Vitals:    02/26/24 1114   Temp: 98.6 °F (37 °C)       Physical Exam  Constitutional:       General: She is not in acute distress.     Appearance: Normal appearance. She is not ill-appearing or toxic-appearing.   HENT:      Head: Normocephalic and atraumatic.   Eyes:      Conjunctiva/sclera: Conjunctivae normal.   Pulmonary:      Effort: Pulmonary effort is normal. No respiratory distress.   Skin:     Comments: No rash on head or neck.    Neurological:      Mental Status: She is alert and oriented to person, place, and time.   Psychiatric:         Mood and Affect: Mood normal.         Behavior: Behavior normal.         Thought Content: Thought content normal.         Judgment: Judgment normal.         Visit Time  Total Visit Duration: 10 minutes    Assessment/Plan:    There are no diagnoses linked to this encounter.    Patient Instructions   As we discussed your illness is viral.  No antibiotics are indicated at this time.  Rest and drink extra fluids.  As we discussed start Flonase.  You can use affrin for up to 3 days.  We dicussed that decongestants and allergy medication may decrease supply and you would like to avoid that.  Tylenol or Motrin as needed for pain or fever.  Salt water gargles and throat lozenges for sore throat.  Follow up with PCP if no improvement.  Go to ER with worsening symptoms.      Cold Symptoms   WHAT YOU NEED TO KNOW:   A cold is an infection caused by a virus. The infection causes your upper respiratory system to become inflamed. Common symptoms of a cold include sneezing, dry throat, a stuffy nose,  headache, watery eyes, and a cough. Your cough may be dry, or you may cough up mucus. You may also have muscle aches, joint pain, and tiredness. Rarely, you may have a fever. Most colds go away without treatment.  DISCHARGE INSTRUCTIONS:   Return to the emergency department if:   You have increased tiredness and weakness.    You are unable to eat.     Your heart is beating much faster than usual for you.     You see white spots in the back of your throat and your neck is swollen and sore to the touch.     You see pinpoint or larger reddish-purple dots on your skin.  Contact your healthcare provider if:   You have a fever higher than 102°F (38.9°C).    You have new or worsening shortness of breath.     You have thick nasal drainage for more than 2 days.     Your symptoms do not improve or get worse within 5 days.     You have questions or concerns about your condition or care.  Medicines:  The following medicines may be suggested by your healthcare provider to decrease your cold symptoms. These medicines are available without a doctor's order. Ask which medicines to take and when to take them. Follow directions.  NSAIDs or acetaminophen  help to bring down a fever or decrease pain.    Decongestants  help decrease nasal stuffiness.     Antihistamines  help decrease sneezing and a runny nose.     Cough suppressants  help decrease how much you cough.    Expectorants  help loosen mucus so you can cough it up.    Take your medicine as directed.  Contact your healthcare provider if you think your medicine is not helping or if you have side effects. Tell him of her if you are allergic to any medicine. Keep a list of the medicines, vitamins, and herbs you take. Include the amounts, and when and why you take them. Bring the list or the pill bottles to follow-up visits. Carry your medicine list with you in case of an emergency.  Symptom relief:  The following may help relieve cold symptoms, such as a dry throat and  congestion:  Gargle with mouthwash or warm salt water as directed.     Suck on throat lozenges or hard candy.     Use a cold or warm vaporizer or humidifier to ease your breathing.     Rest for at least 2 days and then as needed to decrease tiredness and weakness.     Use petroleum based jelly around your nostrils to decrease irritation from blowing your nose.  Drink liquids:  Liquids will help thin and loosen thick mucus so you can cough it up. Liquids will also keep you hydrated. Ask your healthcare provider which liquids are best for you and how much to drink each day.  Prevent the spread of germs:  You can spread your cold germs to others for at least 3 days after your symptoms start. Wash your hands often. Do not share items, such as eating utensils. Cover your nose and mouth when you cough or sneeze using the crook of your elbow instead of your hands. Throw used tissues in the garbage.  Do not smoke:  Smoking may worsen your symptoms and increase the length of time you feel sick. Talk with your healthcare provider if you need help to stop smoking.  Follow up with your healthcare provider as directed:  Write down your questions so you remember to ask them during your visits.   © 2017 [employer] Information is for End User's use only and may not be sold, redistributed or otherwise used for commercial purposes. All illustrations and images included in CareNotes® are the copyrighted property of A.D.A.M., Inc. or [employer].  The above information is an  only. It is not intended as medical advice for individual conditions or treatments. Talk to your doctor, nurse or pharmacist before following any medical regimen to see if it is safe and effective for you.

## 2024-02-26 NOTE — PATIENT INSTRUCTIONS
As we discussed your illness is viral.  No antibiotics are indicated at this time.  Rest and drink extra fluids.  As we discussed start Flonase.  You can use affrin for up to 3 days.  We dicussed that decongestants and allergy medication may decrease supply and you would like to avoid that.  Tylenol or Motrin as needed for pain or fever.  Salt water gargles and throat lozenges for sore throat.  Follow up with PCP if no improvement.  Go to ER with worsening symptoms.      Cold Symptoms   WHAT YOU NEED TO KNOW:   A cold is an infection caused by a virus. The infection causes your upper respiratory system to become inflamed. Common symptoms of a cold include sneezing, dry throat, a stuffy nose, headache, watery eyes, and a cough. Your cough may be dry, or you may cough up mucus. You may also have muscle aches, joint pain, and tiredness. Rarely, you may have a fever. Most colds go away without treatment.  DISCHARGE INSTRUCTIONS:   Return to the emergency department if:   You have increased tiredness and weakness.    You are unable to eat.     Your heart is beating much faster than usual for you.     You see white spots in the back of your throat and your neck is swollen and sore to the touch.     You see pinpoint or larger reddish-purple dots on your skin.  Contact your healthcare provider if:   You have a fever higher than 102°F (38.9°C).    You have new or worsening shortness of breath.     You have thick nasal drainage for more than 2 days.     Your symptoms do not improve or get worse within 5 days.     You have questions or concerns about your condition or care.  Medicines:  The following medicines may be suggested by your healthcare provider to decrease your cold symptoms. These medicines are available without a doctor's order. Ask which medicines to take and when to take them. Follow directions.  NSAIDs or acetaminophen  help to bring down a fever or decrease pain.    Decongestants  help decrease nasal  stuffiness.     Antihistamines  help decrease sneezing and a runny nose.     Cough suppressants  help decrease how much you cough.    Expectorants  help loosen mucus so you can cough it up.    Take your medicine as directed.  Contact your healthcare provider if you think your medicine is not helping or if you have side effects. Tell him of her if you are allergic to any medicine. Keep a list of the medicines, vitamins, and herbs you take. Include the amounts, and when and why you take them. Bring the list or the pill bottles to follow-up visits. Carry your medicine list with you in case of an emergency.  Symptom relief:  The following may help relieve cold symptoms, such as a dry throat and congestion:  Gargle with mouthwash or warm salt water as directed.     Suck on throat lozenges or hard candy.     Use a cold or warm vaporizer or humidifier to ease your breathing.     Rest for at least 2 days and then as needed to decrease tiredness and weakness.     Use petroleum based jelly around your nostrils to decrease irritation from blowing your nose.  Drink liquids:  Liquids will help thin and loosen thick mucus so you can cough it up. Liquids will also keep you hydrated. Ask your healthcare provider which liquids are best for you and how much to drink each day.  Prevent the spread of germs:  You can spread your cold germs to others for at least 3 days after your symptoms start. Wash your hands often. Do not share items, such as eating utensils. Cover your nose and mouth when you cough or sneeze using the crook of your elbow instead of your hands. Throw used tissues in the garbage.  Do not smoke:  Smoking may worsen your symptoms and increase the length of time you feel sick. Talk with your healthcare provider if you need help to stop smoking.  Follow up with your healthcare provider as directed:  Write down your questions so you remember to ask them during your visits.   © 2017 [employer] Information  is for End User's use only and may not be sold, redistributed or otherwise used for commercial purposes. All illustrations and images included in CareNotes® are the copyrighted property of A.D.A.M., Inc. or [employer].  The above information is an  only. It is not intended as medical advice for individual conditions or treatments. Talk to your doctor, nurse or pharmacist before following any medical regimen to see if it is safe and effective for you.

## 2024-02-26 NOTE — CARE ANYWHERE EVISITS
Visit Summary for KARAN FINNEGAN - Gender: Female - Date of Birth: 1995  Date: [id number] - Duration: 7 minutes  Patient: KARAN FINNEGAN  Provider: Steven BLANKENSHIP    Patient Contact Information  Address  [address]     [facility] [address]  [phone]    Visit Topics  Cold [Added By: Self - 2024-02-26]    Triage Questions   What is your current physical address in the event of a medical emergency? Answer []  Are you allergic to any medications? Answer []  Are you now or could you be pregnant? Answer []  Do you have any immune system compromise or chronic lung   disease? Answer []  Do you have any vulnerable family members in the home (infant, pregnant, cancer, elderly)? Answer []     Conversation Transcripts  [0A][0A] [Notification] You are connected with Steven BLANKENSHIP, Urgent Care Specialist.[0A][Notification] KARAN FINNEGAN is located in [place].[0A][Notification] KARAN FINNEGAN has shared health history...[0A]    Diagnosis  Acute upper respiratory infection, unspecified    Procedures  Value: 25619 Code: CPT-4 UNLISTED E&M SERVICE    Medications Prescribed    No prescriptions ordered    Electronically signed by: Steven Seo(NPI [id number])

## 2024-02-27 ENCOUNTER — NURSE TRIAGE (OUTPATIENT)
Age: 29
End: 2024-02-27

## 2024-02-27 DIAGNOSIS — N61.0 MASTITIS: Primary | ICD-10-CM

## 2024-02-27 DIAGNOSIS — N61.0 MASTITIS: ICD-10-CM

## 2024-02-27 RX ORDER — DICLOXACILLIN SODIUM 250 MG/1
500 CAPSULE ORAL EVERY 6 HOURS SCHEDULED
Qty: 28 CAPSULE | Refills: 0 | Status: SHIPPED | OUTPATIENT
Start: 2024-02-27 | End: 2024-02-27

## 2024-02-27 RX ORDER — DICLOXACILLIN SODIUM 250 MG/1
500 CAPSULE ORAL EVERY 6 HOURS SCHEDULED
Qty: 28 CAPSULE | Refills: 0 | Status: CANCELLED | OUTPATIENT
Start: 2024-02-27 | End: 2024-03-05

## 2024-02-27 RX ORDER — DICLOXACILLIN SODIUM 250 MG/1
500 CAPSULE ORAL EVERY 6 HOURS SCHEDULED
Qty: 28 CAPSULE | Refills: 0 | Status: SHIPPED | OUTPATIENT
Start: 2024-02-27 | End: 2024-02-27 | Stop reason: SDUPTHER

## 2024-02-27 NOTE — TELEPHONE ENCOUNTER
Patient called back to follow up on earlier phone call and concerns of Mastitis. Advised her of the medication that was sent to [facility] by Dr. Odell. The patient communicated they will  the prescription and take as directed. Advised the patient if they experience onset of fever, any worsening symptoms or additional concerns please call us back. Patient communicated understanding.

## 2024-02-27 NOTE — TELEPHONE ENCOUNTER
Pt  called and request Medication  for dicloxacillin (DYNAPEN) 250 mg capsule  be sent to [facility]  in [place]

## 2024-02-27 NOTE — TELEPHONE ENCOUNTER
"Pt called in reporting right breast redness, painful, warm to touch. Started yesterday, came on suddenly. Unsure about fever. Feels small lump, possible clogged duct. Has been massaging, using heat and vibration to clear clog. Pt aware to call back with worsening symptoms.     Reason for Disposition   Breast looks infected (area of redness, feels hot to touch) and no fever    Answer Assessment - Initial Assessment Questions  1. PAIN: \"How bad is the pain?\"  (Scale 1-10; or mild, moderate, severe)    - MILD - doesn't interfere with normal activities     - MODERATE - interferes with normal activities or awakens from sleep     - SEVERE - excruciating pain, unable to do any normal activities       <5/10 unless baby bumps it  2. SKIN: \"Does the skin appear red?\"        yes  3. LOCATION: \"Which breast?\" (e.g., left, right, both)      right  4. DELIVERY: \"When was the baby born?\"       11/29/[birth year]  5. BREASTFEEDING: \"Have you been breastfeeding?\" If yes, ask: \"When did you stop?\"      Still breastfeeding  6. ONSET: \"When did the breast pain start?\" (e.g., hours, days)      yesterday  7. FEVER: \"Do you have a fever?\" If Yes, ask: \"What is it, how was it measured, and when did it start?\"      unknown  8. OTHER SYMPTOMS: \"Do you have any other symptoms?\" (e.g., abdominal pain, feeling sad or depressed, weakness)      Redness and warm to touch    Protocols used: Postpartum - Breast Pain and Engorgement-ADULT-[place]    "

## 2024-02-27 NOTE — TELEPHONE ENCOUNTER
I sent dicloxicillin to [facility]. 4 times a day. Recommend ibuprofen q6 hrs. Continue to breast feed/pump as per normal.

## 2024-02-27 NOTE — TELEPHONE ENCOUNTER
Regarding: pt has mascitis  ----- Message from Pati Waterman sent at 2/27/2024  8:47 AM EST -----  Patient called and has mascitis and is using tylenol for the pain and she is having redness.  She goes to [facility] in [place].  Please call patient back at [phone]. Thank you

## 2024-03-29 ENCOUNTER — NURSE TRIAGE (OUTPATIENT)
Age: 29
End: 2024-03-29

## 2024-03-29 DIAGNOSIS — N61.0 MASTITIS: Primary | ICD-10-CM

## 2024-03-29 RX ORDER — DICLOXACILLIN SODIUM 250 MG/1
500 CAPSULE ORAL EVERY 6 HOURS SCHEDULED
Qty: 80 CAPSULE | Refills: 0 | Status: SHIPPED | OUTPATIENT
Start: 2024-03-29 | End: 2024-03-29

## 2024-03-29 NOTE — TELEPHONE ENCOUNTER
Patient left message c/o has mastitis again x 2 days.  Did not answer return call. Left message for patient that her message would be forwarded to [facility] to be evaluated by a provider.  Patient's last Rx for mastitis was on 2/27/24.

## 2024-03-29 NOTE — TELEPHONE ENCOUNTER
Patient aware of Dr. Vides's recommendations. Patient would like to know if there is anything she can do to prevent getting mastitis as this would be her second time in a month.

## 2024-03-29 NOTE — TELEPHONE ENCOUNTER
She can [facility] an appt at [facility]  We can also have her see physical therapy and they will do therapeutic massage

## 2024-03-29 NOTE — TELEPHONE ENCOUNTER
Regarding: Mastatits  ----- Message from Gilson Greene sent at 3/29/2024  7:58 AM EDT -----  Patient thinks she may have mastitis started yesterday.

## 2024-05-17 ENCOUNTER — TELEPHONE (OUTPATIENT)
Dept: FAMILY MEDICINE CLINIC | Facility: CLINIC | Age: 29
End: 2024-05-17

## 2024-06-04 ENCOUNTER — OFFICE VISIT (OUTPATIENT)
Age: 29
End: 2024-06-04
Payer: COMMERCIAL

## 2024-06-04 VITALS
HEIGHT: 62 IN | BODY MASS INDEX: 24.84 KG/M2 | SYSTOLIC BLOOD PRESSURE: 110 MMHG | HEART RATE: 73 BPM | DIASTOLIC BLOOD PRESSURE: 64 MMHG | TEMPERATURE: 98.6 F | OXYGEN SATURATION: 98 % | WEIGHT: 135 LBS

## 2024-06-04 DIAGNOSIS — Z00.00 ANNUAL PHYSICAL EXAM: Primary | ICD-10-CM

## 2024-06-04 DIAGNOSIS — Z13.220 SCREENING CHOLESTEROL LEVEL: ICD-10-CM

## 2024-06-04 DIAGNOSIS — Z13.1 SCREENING FOR DIABETES MELLITUS: ICD-10-CM

## 2024-06-04 PROCEDURE — 99385 PREV VISIT NEW AGE 18-39: CPT | Performed by: FAMILY MEDICINE

## 2024-06-04 NOTE — PROGRESS NOTES
Assessment/Plan:    1. Annual physical exam  -     Comprehensive metabolic panel; Future  -     CBC and differential; Future  -     TSH, 3rd generation with Free T4 reflex; Future  2. Screening cholesterol level  -     Lipid Panel with Direct LDL reflex; Future  3. Screening for diabetes mellitus  -     Hemoglobin A1C; Future          There are no Patient Instructions on file for this visit.    Return for Annual physical.    Subjective:      Patient ID: Marii Bond is a 29 y.o. female.    Chief Complaint   Patient presents with    [facility]     New patient visit (last pcp was also with  [facility])       HPI    Adult Annual Physical   Patient here for a comprehensive physical exam. The patient reports no problems.    Diet and Physical Activity  Diet/Nutrition: well balanced diet.   Exercise: moderate cardiovascular exercise.      Depression Screening  PHQ-9 Depression Screening    PHQ-9:    Frequency of the following problems over the past two weeks:            General Health  Sleep: sleeps well.   Hearing: normal - bilateral.  Vision: no vision problems.   Dental: regular dental visits.       /GYN Health  Patient is: premenopausal  Last menstrual period: none after delivery  Contraceptive method:  none  PAP 2022 normal  Self breast exams- discussed        The following portions of the patient's history were reviewed and updated as appropriate: allergies, current medications, past family history, past medical history, past social history, past surgical history and problem list.    Review of Systems      Constitutional:  Denies fever or chills   Eyes:  Denies double , blurry vision or eye pain  HENT:  Denies nasal congestion, sore throat or new hearing issues  Respiratory:  Denies cough or shortness of breath or wheezing  Cardiovascular:  Denies palpitations or chest pain  GI:  Denies abdominal pain, nausea, or vomiting, no loose stools, no reflux  Integument:  Denies rash , no open areas  Neurologic:   "Denies headache or focal weakness, no dizziness  : no dysuria, or hematuria      Current Outpatient Medications   Medication Sig Dispense Refill    acetaminophen (TYLENOL) 325 mg tablet Take 2 tablets (650 mg total) by mouth every 6 (six) hours as needed for mild pain, fever, moderate pain or headaches for up to 30 doses 30 tablet 0    albuterol (ProAir HFA) 90 mcg/act inhaler Inhale 2 puffs every 6 (six) hours as needed for wheezing 8.5 g 1    ibuprofen (MOTRIN) 600 mg tablet Take 1 tablet (600 mg total) by mouth every 6 (six) hours as needed for mild pain, moderate pain or headaches 30 tablet 0    MAGNESIUM PO Take by mouth      Prenat-Fe Carbonyl-FA-Omega 3 (ONE-A-DAY WOMENS PRENATAL 1 PO) Take by mouth (Patient not taking: Reported on 6/4/2024)       No current facility-administered medications for this visit.       Objective:    /64 (BP Location: Left arm, Patient Position: Sitting, Cuff Size: Adult)   Pulse 73   Temp 98.6 °F (37 °C) (Temporal)   Ht 5' 2\" (1.575 m)   Wt 61.2 kg (135 lb)   SpO2 98%   BMI 24.69 kg/m²        Physical Exam      Constitutional:  Well developed, well nourished, no acute distress, non-toxic appearance   Eyes:  PERRL, conjunctiva normal , non icteric sclera  HENT:  Atraumatic, oropharynx moist. Neck-  supple   Respiratory:  CTA b/l, normal breath sounds, no rales, no wheezing   Cardiovascular:  RRR, no murmurs, no LE edema b/l  GI:  Soft, nondistended, normal bowel sounds x 4, nontender, no organomegaly, no mass, no rebound, no guarding   Neurologic:  no focal deficits noted   Psychiatric:  Speech and behavior appropriate , AAO x 3      Diego Wright DO  "

## 2024-07-16 ENCOUNTER — ANNUAL EXAM (OUTPATIENT)
Dept: OBGYN CLINIC | Facility: CLINIC | Age: 29
End: 2024-07-16
Payer: COMMERCIAL

## 2024-07-16 VITALS
DIASTOLIC BLOOD PRESSURE: 70 MMHG | HEIGHT: 62 IN | BODY MASS INDEX: 25.03 KG/M2 | SYSTOLIC BLOOD PRESSURE: 108 MMHG | WEIGHT: 136 LBS

## 2024-07-16 DIAGNOSIS — Z01.419 WOMEN'S ANNUAL ROUTINE GYNECOLOGICAL EXAMINATION: Primary | ICD-10-CM

## 2024-07-16 PROBLEM — Z98.891 STATUS POST PRIMARY LOW TRANSVERSE CESAREAN SECTION: Status: RESOLVED | Noted: 2023-10-24 | Resolved: 2024-07-16

## 2024-07-16 PROBLEM — O36.8390 NON-REASSURING ELECTRONIC FETAL MONITORING TRACING: Status: RESOLVED | Noted: 2023-11-29 | Resolved: 2024-07-16

## 2024-07-16 PROBLEM — Z34.03 ENCOUNTER FOR SUPERVISION OF NORMAL FIRST PREGNANCY IN THIRD TRIMESTER: Status: RESOLVED | Noted: 2023-07-12 | Resolved: 2024-07-16

## 2024-07-16 PROBLEM — Z00.00 ANNUAL PHYSICAL EXAM: Status: RESOLVED | Noted: 2024-06-04 | Resolved: 2024-07-16

## 2024-07-16 PROCEDURE — S0612 ANNUAL GYNECOLOGICAL EXAMINA: HCPCS | Performed by: PHYSICIAN ASSISTANT

## 2024-07-16 RX ORDER — DEXTROAMPHETAMINE SACCHARATE, AMPHETAMINE ASPARTATE, DEXTROAMPHETAMINE SULFATE AND AMPHETAMINE SULFATE 2.5; 2.5; 2.5; 2.5 MG/1; MG/1; MG/1; MG/1
TABLET ORAL
COMMUNITY
Start: 2024-07-03 | End: 2024-07-16

## 2024-07-16 NOTE — PROGRESS NOTES
ASSESSMENT & PLAN:   Diagnoses and all orders for this visit:    Women's annual routine gynecological examination    Other orders  -     Discontinue: amphetamine-dextroamphetamine (ADDERALL) 10 mg tablet;  (Patient not taking: Reported on 2024)  -     Prenatal MV-Min-Fe Fum-FA-DHA (PRENATAL 1 PO); Take by mouth POST-          The following were reviewed in today's visit: ASCCP guidelines, Gardisil vaccination, STD testing breast self exam, STD testing, exercise, and healthy diet.    Patient to return to office in yearly for annual exam.     All questions have been answered to her satisfaction.        CC:  Annual Gynecologic Examination  Chief Complaint   Patient presents with    Gynecologic Exam     pap 22 wnl, hpv-        HPI: Marii Bond is a 29 y.o.  who presents for annual gynecologic examination.  She has the following concerns:  none at this time. Currently breastfeeding. No periods. Considering conception in the near future.       Health Maintenance:    Exercise: frequently  Breast exams/breast awareness: yes    Past Medical History:   Diagnosis Date    Abnormal Pap smear of cervix     Anxiety     Asthma     Eczema     Kidney stone     Migraine     Palpitations     Tachycardia     Varicella        Past Surgical History:   Procedure Laterality Date    IN  DELIVERY ONLY N/A 2023    Procedure:  SECTION ();  Surgeon: Zully Arevalo MD;  Location: [facility] ;  Service: Obstetrics    WISDOM TOOTH EXTRACTION         Past OB/Gyn History:   No LMP recorded (lmp unknown).    Last Pap:  : no abnormalities  History of abnormal Pap smear: yes - ascus HPV negative in   HPV vaccine completed: yes    Patient is currently sexually active.   STD testing: no  Current contraception: coitus interruptus      Family History  Family History   Problem Relation Age of Onset    Diabetes Maternal Grandmother     Hypertension Maternal Grandmother     Thyroid disease  Maternal Grandmother     Stroke Maternal Grandmother     Hyperlipidemia Maternal Grandmother     Cervical cancer Paternal Grandmother     Breast cancer Maternal Aunt     Glaucoma Mother     Ovarian cysts Mother     No Known Problems Father     No Known Problems Maternal Grandfather     No Known Problems Paternal Grandfather     No Known Problems Brother     No Known Problems Brother     Breast cancer Other     Ovarian cancer Other     Cancer Other         bladder       Family history of uterine or ovarian cancer: yes  Family history of breast cancer: yes  Family history of colon cancer: no    Social History:  Social History     Socioeconomic History    Marital status: /Civil Union     Spouse name: Not on file    Number of children: Not on file    Years of education: Not on file    Highest education level: Not on file   Occupational History    Not on file   Tobacco Use    Smoking status: Never    Smokeless tobacco: Never   Vaping Use    Vaping status: Never Used   Substance and Sexual Activity    Alcohol use: Not Currently     Comment: rarely-just prior to pregnancy    Drug use: Never    Sexual activity: Yes     Partners: Male     Birth control/protection: None, Coitus interruptus   Other Topics Concern    Not on file   Social History Narrative    Exercises regularly     Social Determinants of Health     Financial Resource Strain: Not on file   Food Insecurity: Not on file   Transportation Needs: Not on file   Physical Activity: Sufficiently Active (4/19/2021)    Exercise Vital Sign     Days of Exercise per Week: 4 days     Minutes of Exercise per Session: 60 min   Stress: No Stress Concern Present (4/19/2021)    [employer] - Occupational Stress Questionnaire     Feeling of Stress : Not at all   Social Connections: Not on file   Intimate Partner Violence: Not on file   Housing Stability: Not on file     Domestic violence screen: negative    Allergies:  Allergies   Allergen  "Reactions    Grapefruit Extract - Food Allergy Cough     Throat turns red     Latex Rash and Cough    Ceclor [Cefaclor] Hives     Other reaction(s): hives    Vinyl Ether Swelling and Cough       Medications:    Current Outpatient Medications:     Prenatal MV-Min-Fe Fum-FA-DHA (PRENATAL 1 PO), Take by mouth POST-, Disp: , Rfl:     Review of Systems:  Review of Systems   Constitutional:  Negative for chills, fever and unexpected weight change.   Respiratory:  Negative for shortness of breath.    Cardiovascular:  Negative for chest pain.   Gastrointestinal:  Negative for abdominal pain, diarrhea, nausea and vomiting.   Skin:  Negative for rash.   Psychiatric/Behavioral:  Negative for dysphoric mood. The patient is not nervous/anxious.          Physical Exam:  /70 (BP Location: Left arm, Patient Position: Sitting, Cuff Size: Standard)   Ht 5' 2\" (1.575 m)   Wt 61.7 kg (136 lb)   LMP  (LMP Unknown)   Breastfeeding Yes   BMI 24.87 kg/m²    Physical Exam  Constitutional:       Appearance: Normal appearance.   Genitourinary:      Vulva and urethral meatus normal.      No lesions in the vagina.      Right Labia: No rash or lesions.     Left Labia: No lesions or rash.     No vaginal discharge, erythema or bleeding.        Right Adnexa: not tender and no mass present.     Left Adnexa: not tender and no mass present.     No cervical discharge or lesion.      Uterus is not tender.   Breasts:     Breast exam comments: Deferred due to breastfeeding status.  HENT:      Head: Normocephalic and atraumatic.   Cardiovascular:      Rate and Rhythm: Normal rate and regular rhythm.      Heart sounds: Normal heart sounds. No murmur heard.     No friction rub. No gallop.   Pulmonary:      Effort: Pulmonary effort is normal.      Breath sounds: Normal breath sounds. No wheezing, rhonchi or rales.   Abdominal:      General: Abdomen is flat. There is no distension.      Palpations: Abdomen is soft.      Tenderness: There is no " abdominal tenderness.   Musculoskeletal:      Cervical back: Neck supple.   Neurological:      General: No focal deficit present.      Mental Status: She is alert.   Skin:     General: Skin is warm and dry.   Psychiatric:         Mood and Affect: Mood normal.         Behavior: Behavior normal.   Vitals reviewed.

## 2024-09-07 ENCOUNTER — APPOINTMENT (OUTPATIENT)
Dept: LAB | Age: 29
End: 2024-09-07
Payer: COMMERCIAL

## 2024-09-07 DIAGNOSIS — Z00.00 ANNUAL PHYSICAL EXAM: ICD-10-CM

## 2024-09-07 DIAGNOSIS — Z13.220 SCREENING CHOLESTEROL LEVEL: ICD-10-CM

## 2024-09-07 DIAGNOSIS — Z13.1 SCREENING FOR DIABETES MELLITUS: ICD-10-CM

## 2024-09-07 LAB
ALBUMIN SERPL BCG-MCNC: 4.5 G/DL (ref 3.5–5)
ALP SERPL-CCNC: 74 U/L (ref 34–104)
ALT SERPL W P-5'-P-CCNC: 12 U/L (ref 7–52)
ANION GAP SERPL CALCULATED.3IONS-SCNC: 8 MMOL/L (ref 4–13)
AST SERPL W P-5'-P-CCNC: 13 U/L (ref 13–39)
BASOPHILS # BLD AUTO: 0.05 THOUSANDS/ÂΜL (ref 0–0.1)
BASOPHILS NFR BLD AUTO: 1 % (ref 0–1)
BILIRUB SERPL-MCNC: 1.05 MG/DL (ref 0.2–1)
BUN SERPL-MCNC: 18 MG/DL (ref 5–25)
CALCIUM SERPL-MCNC: 9.8 MG/DL (ref 8.4–10.2)
CHLORIDE SERPL-SCNC: 104 MMOL/L (ref 96–108)
CHOLEST SERPL-MCNC: 225 MG/DL
CO2 SERPL-SCNC: 30 MMOL/L (ref 21–32)
CREAT SERPL-MCNC: 0.64 MG/DL (ref 0.6–1.3)
EOSINOPHIL # BLD AUTO: 0.3 THOUSAND/ÂΜL (ref 0–0.61)
EOSINOPHIL NFR BLD AUTO: 5 % (ref 0–6)
ERYTHROCYTE [DISTWIDTH] IN BLOOD BY AUTOMATED COUNT: 12.4 % (ref 11.6–15.1)
EST. AVERAGE GLUCOSE BLD GHB EST-MCNC: 94 MG/DL
GFR SERPL CREATININE-BSD FRML MDRD: 119 ML/MIN/1.73SQ M
GLUCOSE P FAST SERPL-MCNC: 81 MG/DL (ref 65–99)
HBA1C MFR BLD: 4.9 %
HCT VFR BLD AUTO: 46.8 % (ref 34.8–46.1)
HDLC SERPL-MCNC: 105 MG/DL
HGB BLD-MCNC: 15.3 G/DL (ref 11.5–15.4)
IMM GRANULOCYTES # BLD AUTO: 0.01 THOUSAND/UL (ref 0–0.2)
IMM GRANULOCYTES NFR BLD AUTO: 0 % (ref 0–2)
LDLC SERPL CALC-MCNC: 113 MG/DL (ref 0–100)
LYMPHOCYTES # BLD AUTO: 2.17 THOUSANDS/ÂΜL (ref 0.6–4.47)
LYMPHOCYTES NFR BLD AUTO: 37 % (ref 14–44)
MCH RBC QN AUTO: 30.4 PG (ref 26.8–34.3)
MCHC RBC AUTO-ENTMCNC: 32.7 G/DL (ref 31.4–37.4)
MCV RBC AUTO: 93 FL (ref 82–98)
MONOCYTES # BLD AUTO: 0.48 THOUSAND/ÂΜL (ref 0.17–1.22)
MONOCYTES NFR BLD AUTO: 8 % (ref 4–12)
NEUTROPHILS # BLD AUTO: 2.88 THOUSANDS/ÂΜL (ref 1.85–7.62)
NEUTS SEG NFR BLD AUTO: 49 % (ref 43–75)
NRBC BLD AUTO-RTO: 0 /100 WBCS
PLATELET # BLD AUTO: 337 THOUSANDS/UL (ref 149–390)
PMV BLD AUTO: 9.4 FL (ref 8.9–12.7)
POTASSIUM SERPL-SCNC: 4.4 MMOL/L (ref 3.5–5.3)
PROT SERPL-MCNC: 6.7 G/DL (ref 6.4–8.4)
RBC # BLD AUTO: 5.04 MILLION/UL (ref 3.81–5.12)
SODIUM SERPL-SCNC: 142 MMOL/L (ref 135–147)
TRIGL SERPL-MCNC: 37 MG/DL
TSH SERPL DL<=0.05 MIU/L-ACNC: 1.7 UIU/ML (ref 0.45–4.5)
WBC # BLD AUTO: 5.89 THOUSAND/UL (ref 4.31–10.16)

## 2024-09-07 PROCEDURE — 83036 HEMOGLOBIN GLYCOSYLATED A1C: CPT

## 2024-09-07 PROCEDURE — 84443 ASSAY THYROID STIM HORMONE: CPT

## 2024-09-07 PROCEDURE — 36415 COLL VENOUS BLD VENIPUNCTURE: CPT

## 2024-09-07 PROCEDURE — 80061 LIPID PANEL: CPT

## 2024-09-07 PROCEDURE — 85025 COMPLETE CBC W/AUTO DIFF WBC: CPT

## 2024-09-07 PROCEDURE — 80053 COMPREHEN METABOLIC PANEL: CPT

## 2025-05-01 ENCOUNTER — TELEPHONE (OUTPATIENT)
Age: 30
End: 2025-05-01

## 2025-05-01 NOTE — TELEPHONE ENCOUNTER
Received call from [facility] for New Patient - Skin Check - SOC. Scheduled 9/29/25 9:00 am Edu Galan. Verified insurance, provided [place] addr.     Patient verbalized understanding.

## 2025-06-16 ENCOUNTER — OFFICE VISIT (OUTPATIENT)
Age: 30
End: 2025-06-16
Payer: COMMERCIAL

## 2025-06-16 VITALS
TEMPERATURE: 98.6 F | SYSTOLIC BLOOD PRESSURE: 96 MMHG | OXYGEN SATURATION: 95 % | HEART RATE: 80 BPM | HEIGHT: 64 IN | BODY MASS INDEX: 23.39 KG/M2 | WEIGHT: 137 LBS | DIASTOLIC BLOOD PRESSURE: 64 MMHG

## 2025-06-16 DIAGNOSIS — Z13.220 SCREENING CHOLESTEROL LEVEL: ICD-10-CM

## 2025-06-16 DIAGNOSIS — Z13.1 SCREENING FOR DIABETES MELLITUS: ICD-10-CM

## 2025-06-16 DIAGNOSIS — Z00.00 ANNUAL PHYSICAL EXAM: Primary | ICD-10-CM

## 2025-06-16 PROCEDURE — 99395 PREV VISIT EST AGE 18-39: CPT | Performed by: FAMILY MEDICINE

## 2025-06-16 RX ORDER — DEXTROAMPHETAMINE SACCHARATE, AMPHETAMINE ASPARTATE, DEXTROAMPHETAMINE SULFATE AND AMPHETAMINE SULFATE 2.5; 2.5; 2.5; 2.5 MG/1; MG/1; MG/1; MG/1
10 TABLET ORAL DAILY
COMMUNITY
Start: 2025-05-02

## 2025-06-16 NOTE — PROGRESS NOTES
Adult Annual Physical  Name: Marii Bond      : 1995      MRN: [id number]  Encounter Provider: Diego Wright DO  Encounter Date: 2025   Encounter department[facility]    :  Assessment & Plan  Annual physical exam    Orders:    Comprehensive metabolic panel; Future    CBC and differential; Future    Screening cholesterol level    Orders:    Lipid Panel with Direct LDL reflex; Future    Screening for diabetes mellitus    Orders:    Hemoglobin A1C; Future        Preventive Screenings:    - Cervical cancer screening: screening up-to-date     Immunizations:  - Immunizations due: Prevnar 20         History of Present Illness     Adult Annual Physical:  Patient presents for annual physical.     Diet and Physical Activity:  - Diet/Nutrition: no special diet, consuming 3-5 servings of fruits/vegetables daily, adequate whole grain intake and adequate fiber intake.  - Exercise: walking, 5-7 times a week on average and 30-60 minutes on average.    Depression Screening:  - PHQ-2 Score: 0    General Health:  - Sleep: 4-6 hours of sleep on average and sleeps well.  - Hearing: normal hearing bilateral ears.  - Vision: most recent eye exam > 1 year ago and wears glasses.  - Dental: regular dental visits, brushes teeth three times daily and floss regularly.    /GYN Health:  - Follows with GYN: yes.   - Menopause: premenopausal.   - Last menstrual cycle: 2025.   - History of STDs: no    Advanced Care Planning:  - Has an advanced directive?: no    - Has a durable medical POA?: no      Review of Systems    Constitutional:  Denies fever or chills   Eyes:  Denies double , blurry vision or eye pain  HENT:  Denies nasal congestion, sore throat or new hearing issues  Respiratory:  Denies cough or shortness of breath or wheezing  Cardiovascular:  Denies palpitations or chest pain  GI:  Denies abdominal pain, nausea, or vomiting, no loose stools, no reflux  Integument:  Denies  "rash , no open areas  Neurologic:  Denies headache or focal weakness, no dizziness  : no dysuria, or hematuria          Objective   BP 96/64 (BP Location: Left arm, Patient Position: Sitting, Cuff Size: Standard)   Pulse 80   Temp 98.6 °F (37 °C) (Temporal)   Ht 5' 4.49\" (1.638 m)   Wt 62.1 kg (137 lb)   SpO2 95%   BMI 23.16 kg/m²     Physical Exam    Constitutional:  Well developed, well nourished, no acute distress, non-toxic appearance   Eyes:  PERRL, conjunctiva normal , non icteric sclera  HENT:  Atraumatic, oropharynx moist. Neck-  supple   Respiratory:  CTA b/l, normal breath sounds, no rales, no wheezing   Cardiovascular:  RRR, no murmurs, no LE edema b/l  GI:  Soft, nondistended, normal bowel sounds x 4, nontender, no organomegaly, no mass, no rebound, no guarding   Neurologic:  no focal deficits noted   Psychiatric:  Speech and behavior appropriate , AAO x 3      "

## 2025-07-06 ENCOUNTER — PATIENT MESSAGE (OUTPATIENT)
Dept: OBGYN CLINIC | Facility: CLINIC | Age: 30
End: 2025-07-06

## 2025-08-05 ENCOUNTER — ULTRASOUND (OUTPATIENT)
Dept: OBGYN CLINIC | Facility: CLINIC | Age: 30
End: 2025-08-05
Payer: COMMERCIAL

## 2025-08-05 VITALS
HEIGHT: 64 IN | BODY MASS INDEX: 23.76 KG/M2 | DIASTOLIC BLOOD PRESSURE: 64 MMHG | SYSTOLIC BLOOD PRESSURE: 94 MMHG | WEIGHT: 139.2 LBS

## 2025-08-05 DIAGNOSIS — Z32.01 PREGNANCY, CONFIRMED, NOT FIRST: Primary | ICD-10-CM

## 2025-08-05 PROCEDURE — 76817 TRANSVAGINAL US OBSTETRIC: CPT | Performed by: PHYSICIAN ASSISTANT

## 2025-08-05 PROCEDURE — 99213 OFFICE O/P EST LOW 20 MIN: CPT | Performed by: PHYSICIAN ASSISTANT

## 2025-08-20 ENCOUNTER — INITIAL PRENATAL (OUTPATIENT)
Dept: OBGYN CLINIC | Facility: CLINIC | Age: 30
End: 2025-08-20

## 2025-08-20 VITALS
BODY MASS INDEX: 24.49 KG/M2 | SYSTOLIC BLOOD PRESSURE: 102 MMHG | WEIGHT: 138.2 LBS | DIASTOLIC BLOOD PRESSURE: 60 MMHG | HEIGHT: 63 IN

## 2025-08-20 DIAGNOSIS — Z34.81 PRENATAL CARE, SUBSEQUENT PREGNANCY, FIRST TRIMESTER: Primary | ICD-10-CM

## 2025-08-20 PROCEDURE — OBC

## 2025-08-20 RX ORDER — ALBUTEROL SULFATE 90 UG/1
2 INHALANT RESPIRATORY (INHALATION) EVERY 6 HOURS PRN
COMMUNITY

## 2025-08-23 PROBLEM — O34.219 PREGNANCY WITH HISTORY OF CESAREAN SECTION, ANTEPARTUM: Status: ACTIVE | Noted: 2025-08-23

## 2025-08-23 PROBLEM — Z36.82 ENCOUNTER FOR ANTENATAL SCREENING FOR NUCHAL TRANSLUCENCY: Status: ACTIVE | Noted: 2025-08-23

## (undated) DEVICE — GLOVE PI ULTRA TOUCH SZ 6

## (undated) DEVICE — PACK C-SECTION PBDS

## (undated) DEVICE — ABDOMINAL PAD: Brand: DERMACEA

## (undated) DEVICE — SUT VICRYL 0 CTX 36 IN J978H

## (undated) DEVICE — DRESSING TELFA 2 X 3 IN STRL

## (undated) DEVICE — GLOVE INDICATOR PI UNDERGLOVE SZ 6.5 BLUE

## (undated) DEVICE — GAUZE SPONGES,USP TYPE VII GAUZE, 12 PLY: Brand: CURITY

## (undated) DEVICE — GAUZE SPONGES,16 PLY: Brand: CURITY

## (undated) DEVICE — SUT VICRYL 0 CT-1 36 IN J946H

## (undated) DEVICE — SKIN MARKER DUAL TIP WITH RULER CAP, FLEXIBLE RULER AND LABELS: Brand: DEVON